# Patient Record
Sex: FEMALE | Race: WHITE | Employment: OTHER | URBAN - METROPOLITAN AREA
[De-identification: names, ages, dates, MRNs, and addresses within clinical notes are randomized per-mention and may not be internally consistent; named-entity substitution may affect disease eponyms.]

---

## 2020-05-19 ENCOUNTER — APPOINTMENT (OUTPATIENT)
Dept: GENERAL RADIOLOGY | Age: 85
End: 2020-05-19
Payer: MEDICARE

## 2020-05-19 ENCOUNTER — APPOINTMENT (OUTPATIENT)
Dept: CT IMAGING | Age: 85
End: 2020-05-19
Payer: MEDICARE

## 2020-05-19 ENCOUNTER — HOSPITAL ENCOUNTER (EMERGENCY)
Age: 85
Discharge: HOME | End: 2020-05-19
Attending: EMERGENCY MEDICINE
Payer: MEDICARE

## 2020-05-19 VITALS
HEIGHT: 57 IN | DIASTOLIC BLOOD PRESSURE: 68 MMHG | BODY MASS INDEX: 22.44 KG/M2 | OXYGEN SATURATION: 97 % | RESPIRATION RATE: 16 BRPM | SYSTOLIC BLOOD PRESSURE: 188 MMHG | HEART RATE: 63 BPM | TEMPERATURE: 97.6 F | WEIGHT: 104 LBS

## 2020-05-19 LAB
ALBUMIN SERPL-MCNC: 4.2 G/DL (ref 3.5–5.2)
ALP BLD-CCNC: 31 U/L (ref 35–104)
ALT SERPL-CCNC: 14 U/L (ref 0–32)
ANION GAP SERPL CALCULATED.3IONS-SCNC: 15 MMOL/L (ref 7–16)
AST SERPL-CCNC: 25 U/L (ref 0–31)
BACTERIA: NORMAL /HPF
BASOPHILS ABSOLUTE: 0.04 E9/L (ref 0–0.2)
BASOPHILS RELATIVE PERCENT: 0.6 % (ref 0–2)
BILIRUB SERPL-MCNC: 1.1 MG/DL (ref 0–1.2)
BILIRUBIN URINE: NEGATIVE
BLOOD, URINE: ABNORMAL
BUN BLDV-MCNC: 24 MG/DL (ref 8–23)
CALCIUM SERPL-MCNC: 10.2 MG/DL (ref 8.6–10.2)
CHLORIDE BLD-SCNC: 107 MMOL/L (ref 98–107)
CLARITY: CLEAR
CO2: 23 MMOL/L (ref 22–29)
COLOR: YELLOW
CREAT SERPL-MCNC: 1.2 MG/DL (ref 0.5–1)
EOSINOPHILS ABSOLUTE: 0.05 E9/L (ref 0.05–0.5)
EOSINOPHILS RELATIVE PERCENT: 0.8 % (ref 0–6)
GFR AFRICAN AMERICAN: 51
GFR NON-AFRICAN AMERICAN: 42 ML/MIN/1.73
GLUCOSE BLD-MCNC: 103 MG/DL (ref 74–99)
GLUCOSE URINE: NEGATIVE MG/DL
HCT VFR BLD CALC: 40.8 % (ref 34–48)
HEMOGLOBIN: 14.1 G/DL (ref 11.5–15.5)
IMMATURE GRANULOCYTES #: 0.02 E9/L
IMMATURE GRANULOCYTES %: 0.3 % (ref 0–5)
KETONES, URINE: NEGATIVE MG/DL
LEUKOCYTE ESTERASE, URINE: NEGATIVE
LYMPHOCYTES ABSOLUTE: 0.87 E9/L (ref 1.5–4)
LYMPHOCYTES RELATIVE PERCENT: 13.4 % (ref 20–42)
MCH RBC QN AUTO: 31.9 PG (ref 26–35)
MCHC RBC AUTO-ENTMCNC: 34.6 % (ref 32–34.5)
MCV RBC AUTO: 92.3 FL (ref 80–99.9)
MONOCYTES ABSOLUTE: 0.44 E9/L (ref 0.1–0.95)
MONOCYTES RELATIVE PERCENT: 6.8 % (ref 2–12)
NEUTROPHILS ABSOLUTE: 5.07 E9/L (ref 1.8–7.3)
NEUTROPHILS RELATIVE PERCENT: 78.1 % (ref 43–80)
NITRITE, URINE: NEGATIVE
PDW BLD-RTO: 13.2 FL (ref 11.5–15)
PH UA: 7 (ref 5–9)
PLATELET # BLD: 165 E9/L (ref 130–450)
PMV BLD AUTO: 11.2 FL (ref 7–12)
POTASSIUM REFLEX MAGNESIUM: 4.3 MMOL/L (ref 3.5–5)
PROTEIN UA: 30 MG/DL
RBC # BLD: 4.42 E12/L (ref 3.5–5.5)
RBC UA: NORMAL /HPF (ref 0–2)
SODIUM BLD-SCNC: 145 MMOL/L (ref 132–146)
SPECIFIC GRAVITY UA: 1.01 (ref 1–1.03)
TOTAL PROTEIN: 7.2 G/DL (ref 6.4–8.3)
TROPONIN: <0.01 NG/ML (ref 0–0.03)
UROBILINOGEN, URINE: 0.2 E.U./DL
WBC # BLD: 6.5 E9/L (ref 4.5–11.5)
WBC UA: NORMAL /HPF (ref 0–5)

## 2020-05-19 PROCEDURE — 70450 CT HEAD/BRAIN W/O DYE: CPT

## 2020-05-19 PROCEDURE — 93005 ELECTROCARDIOGRAM TRACING: CPT | Performed by: EMERGENCY MEDICINE

## 2020-05-19 PROCEDURE — 71046 X-RAY EXAM CHEST 2 VIEWS: CPT

## 2020-05-19 PROCEDURE — 80053 COMPREHEN METABOLIC PANEL: CPT

## 2020-05-19 PROCEDURE — 36415 COLL VENOUS BLD VENIPUNCTURE: CPT

## 2020-05-19 PROCEDURE — 85025 COMPLETE CBC W/AUTO DIFF WBC: CPT

## 2020-05-19 PROCEDURE — 84484 ASSAY OF TROPONIN QUANT: CPT

## 2020-05-19 PROCEDURE — 99284 EMERGENCY DEPT VISIT MOD MDM: CPT

## 2020-05-19 PROCEDURE — 81001 URINALYSIS AUTO W/SCOPE: CPT

## 2020-05-19 RX ORDER — AMLODIPINE BESYLATE 5 MG/1
5 TABLET ORAL DAILY
COMMUNITY

## 2020-05-19 RX ORDER — ISOSORBIDE MONONITRATE 30 MG/1
30 TABLET, EXTENDED RELEASE ORAL DAILY
COMMUNITY

## 2020-05-20 LAB
EKG ATRIAL RATE: 60 BPM
EKG P AXIS: 65 DEGREES
EKG P-R INTERVAL: 192 MS
EKG Q-T INTERVAL: 420 MS
EKG QRS DURATION: 100 MS
EKG QTC CALCULATION (BAZETT): 420 MS
EKG R AXIS: 36 DEGREES
EKG T AXIS: -24 DEGREES
EKG VENTRICULAR RATE: 60 BPM

## 2020-05-20 PROCEDURE — 93010 ELECTROCARDIOGRAM REPORT: CPT | Performed by: INTERNAL MEDICINE

## 2020-05-20 NOTE — ED PROVIDER NOTES
HPI:  5/19/20,   Time: 10:11 PM EDT        Maurice Demarco is a 80 y.o. female presenting to the ED for episode of lightheadedness. Patient states earlier this evening she started to feel funny but denies any specific complaints at that time, patient states she took her nitro even though she was not having any chest pain, shortly after she noted that she felt lightheaded and shaky which lasted for several minutes before resolving spontaneously. Patient denies any complaints at this time. She denies headache, visual changes, syncope, chest pain, shortness of breath, abdominal pain, NVD, or any other complaints at this time. ROS:   GEN: no fevers, no chills, no fatique  HENT: No trauma, no sore throat, no swelling throat or oral mucosa  EYES: No changes in vision, no pain  CARDIO: No chest pain, no palpitations  PULM: No trouble breathing, no wheezing  ABD: No pain, no nausea, no vomiting  MSK: No pain, no trauma, no deformities  SKIN: No rashes, no abrasions, no lacerations  NEURO: See HPI     --------------------------------------------- PAST HISTORY ---------------------------------------------  Past Medical History:  has a past medical history of Hyperlipidemia and Hypertension. Past Surgical History:  has a past surgical history that includes Coronary artery bypass graft and Hysterectomy. Social History:  reports that she has never smoked. She has never used smokeless tobacco. She reports previous alcohol use. She reports that she does not use drugs. Family History: family history is not on file. The patients home medications have been reviewed. Allergies: Patient has no known allergies.     -------------------------------------------------- RESULTS -------------------------------------------------  All laboratory and radiology results have been personally reviewed by myself   LABS:  Results for orders placed or performed during the hospital encounter of 05/19/20   CBC Auto Differential Result Value Ref Range    WBC 6.5 4.5 - 11.5 E9/L    RBC 4.42 3.50 - 5.50 E12/L    Hemoglobin 14.1 11.5 - 15.5 g/dL    Hematocrit 40.8 34.0 - 48.0 %    MCV 92.3 80.0 - 99.9 fL    MCH 31.9 26.0 - 35.0 pg    MCHC 34.6 (H) 32.0 - 34.5 %    RDW 13.2 11.5 - 15.0 fL    Platelets 735 611 - 537 E9/L    MPV 11.2 7.0 - 12.0 fL    Neutrophils % 78.1 43.0 - 80.0 %    Immature Granulocytes % 0.3 0.0 - 5.0 %    Lymphocytes % 13.4 (L) 20.0 - 42.0 %    Monocytes % 6.8 2.0 - 12.0 %    Eosinophils % 0.8 0.0 - 6.0 %    Basophils % 0.6 0.0 - 2.0 %    Neutrophils Absolute 5.07 1.80 - 7.30 E9/L    Immature Granulocytes # 0.02 E9/L    Lymphocytes Absolute 0.87 (L) 1.50 - 4.00 E9/L    Monocytes Absolute 0.44 0.10 - 0.95 E9/L    Eosinophils Absolute 0.05 0.05 - 0.50 E9/L    Basophils Absolute 0.04 0.00 - 0.20 E9/L   Comprehensive Metabolic Panel w/ Reflex to MG   Result Value Ref Range    Sodium 145 132 - 146 mmol/L    Potassium reflex Magnesium 4.3 3.5 - 5.0 mmol/L    Chloride 107 98 - 107 mmol/L    CO2 23 22 - 29 mmol/L    Anion Gap 15 7 - 16 mmol/L    Glucose 103 (H) 74 - 99 mg/dL    BUN 24 (H) 8 - 23 mg/dL    CREATININE 1.2 (H) 0.5 - 1.0 mg/dL    GFR Non-African American 42 >=60 mL/min/1.73    GFR African American 51     Calcium 10.2 8.6 - 10.2 mg/dL    Total Protein 7.2 6.4 - 8.3 g/dL    Alb 4.2 3.5 - 5.2 g/dL    Total Bilirubin 1.1 0.0 - 1.2 mg/dL    Alkaline Phosphatase 31 (L) 35 - 104 U/L    ALT 14 0 - 32 U/L    AST 25 0 - 31 U/L   Troponin   Result Value Ref Range    Troponin <0.01 0.00 - 0.03 ng/mL   Urinalysis, reflex to microscopic   Result Value Ref Range    Color, UA Yellow Straw/Yellow    Clarity, UA Clear Clear    Glucose, Ur Negative Negative mg/dL    Bilirubin Urine Negative Negative    Ketones, Urine Negative Negative mg/dL    Specific Gravity, UA 1.015 1.005 - 1.030    Blood, Urine TRACE-INTACT Negative    pH, UA 7.0 5.0 - 9.0    Protein, UA 30 (A) Negative mg/dL    Urobilinogen, Urine 0.2 <2.0 E.U./dL    Nitrite, Urine Negative Negative    Leukocyte Esterase, Urine Negative Negative   Microscopic Urinalysis   Result Value Ref Range    WBC, UA NONE 0 - 5 /HPF    RBC, UA 1-3 0 - 2 /HPF    Bacteria, UA NONE SEEN None Seen /HPF   EKG 12 Lead   Result Value Ref Range    Ventricular Rate 60 BPM    Atrial Rate 60 BPM    P-R Interval 192 ms    QRS Duration 100 ms    Q-T Interval 420 ms    QTc Calculation (Bazett) 420 ms    P Axis 65 degrees    R Axis 36 degrees    T Axis -24 degrees       RADIOLOGY:  Interpreted by Radiologist.  XR CHEST STANDARD (2 VW)   Final Result      No airspace opacities or pleural effusion. CT Head WO Contrast   Final Result      1. Asymmetric hyperdensity in the putamen of the left basal ganglia   may be due to asymmetric physiologic calcifications. Small hemorrhage   cannot be excluded. Follow-up CT of the head or MRI is recommended. 2.  No mass affect or edema is identified.          ------------------------- NURSING NOTES AND VITALS REVIEWED ---------------------------   The nursing notes within the ED encounter and vital signs as below have been reviewed. BP (!) 188/68   Pulse 63   Temp 97.6 °F (36.4 °C) (Infrared)   Resp 16   Ht 4' 9\" (1.448 m)   Wt 104 lb (47.2 kg)   SpO2 97%   BMI 22.51 kg/m²   Oxygen Saturation Interpretation: Normal    ---------------------------------------------------PHYSICAL EXAM--------------------------------------    GEN: No acute distress, well-appearing, well nourished   HENT: Normocephalic, atraumatic, oral mucosa moist  EYES: No scleral injection, no scleral icterus, PERRL   PULM: Lungs clear to ascultation bilaterally, no wheezes, no crackles  CARDIO: Regular rate, regular rhythm, normal L2/F5. + Systolic murmur  ABD: Soft, non-tender, no rigidity  MSK: No deformities, no edema, palpable pulses all extremities   SKIN: No rashes, no lacerations, no abrasions   NEURO: Awake, alert, appropriate. Cranial nerves II through XII grossly intact.   Muscle

## 2020-10-12 ENCOUNTER — APPOINTMENT (OUTPATIENT)
Dept: CT IMAGING | Age: 85
End: 2020-10-12
Payer: MEDICARE

## 2020-10-12 ENCOUNTER — HOSPITAL ENCOUNTER (EMERGENCY)
Age: 85
Discharge: HOME | End: 2020-10-12
Attending: EMERGENCY MEDICINE
Payer: MEDICARE

## 2020-10-12 VITALS
HEART RATE: 66 BPM | SYSTOLIC BLOOD PRESSURE: 144 MMHG | OXYGEN SATURATION: 94 % | DIASTOLIC BLOOD PRESSURE: 82 MMHG | WEIGHT: 101 LBS | RESPIRATION RATE: 16 BRPM | TEMPERATURE: 97.5 F | BODY MASS INDEX: 22.72 KG/M2 | HEIGHT: 56 IN

## 2020-10-12 PROCEDURE — 6370000000 HC RX 637 (ALT 250 FOR IP): Performed by: EMERGENCY MEDICINE

## 2020-10-12 PROCEDURE — 71250 CT THORAX DX C-: CPT

## 2020-10-12 PROCEDURE — 72125 CT NECK SPINE W/O DYE: CPT

## 2020-10-12 PROCEDURE — 73130 X-RAY EXAM OF HAND: CPT

## 2020-10-12 PROCEDURE — 70450 CT HEAD/BRAIN W/O DYE: CPT

## 2020-10-12 PROCEDURE — 72128 CT CHEST SPINE W/O DYE: CPT

## 2020-10-12 PROCEDURE — 73030 X-RAY EXAM OF SHOULDER: CPT

## 2020-10-12 PROCEDURE — 99283 EMERGENCY DEPT VISIT LOW MDM: CPT

## 2020-10-12 PROCEDURE — 99284 EMERGENCY DEPT VISIT MOD MDM: CPT

## 2020-10-12 RX ORDER — ACETAMINOPHEN 500 MG
1000 TABLET ORAL ONCE
Status: COMPLETED | OUTPATIENT
Start: 2020-10-12 | End: 2020-10-12

## 2020-10-12 RX ADMIN — ACETAMINOPHEN 1000 MG: 500 TABLET ORAL at 22:13

## 2020-10-12 ASSESSMENT — PAIN DESCRIPTION - FREQUENCY: FREQUENCY: CONTINUOUS

## 2020-10-12 ASSESSMENT — ENCOUNTER SYMPTOMS
COUGH: 0
SHORTNESS OF BREATH: 0
ABDOMINAL PAIN: 0
BACK PAIN: 1

## 2020-10-12 ASSESSMENT — PAIN DESCRIPTION - PAIN TYPE: TYPE: ACUTE PAIN

## 2020-10-12 ASSESSMENT — PAIN SCALES - GENERAL
PAINLEVEL_OUTOF10: 10
PAINLEVEL_OUTOF10: 10

## 2020-10-12 ASSESSMENT — PAIN DESCRIPTION - PROGRESSION: CLINICAL_PROGRESSION: GRADUALLY WORSENING

## 2020-10-12 ASSESSMENT — PAIN DESCRIPTION - ONSET: ONSET: ON-GOING

## 2020-10-12 ASSESSMENT — PAIN DESCRIPTION - ORIENTATION: ORIENTATION: LEFT

## 2020-10-13 NOTE — ED NOTES
Discharged  Walked 6 feet from bed to wheel chair-  Gait steady   To follow with [address], RN  10/12/20 8088

## 2020-10-13 NOTE — ED NOTES
Unable to ambulate due to leaving cane at home-  Pt is able to bear weight.   Dr Rl Ortiz aware  And is OK with this     Rodolfo Camacho RN  10/12/20 4597

## 2020-10-13 NOTE — ED PROVIDER NOTES
Musculoskeletal: Normal range of motion and neck supple. Vascular: No JVD. Cardiovascular:      Rate and Rhythm: Normal rate and regular rhythm. Heart sounds: No murmur. Pulmonary:      Effort: Pulmonary effort is normal.      Breath sounds: No wheezing, rhonchi or rales. Chest:      Chest wall: No tenderness. Abdominal:      General: There is no distension. Palpations: Abdomen is soft. Tenderness: There is no abdominal tenderness. There is no guarding or rebound. Hernia: No hernia is present. Musculoskeletal:      Right lower leg: No edema. Left lower leg: No edema. Comments: No midline c-spine, t-spine or l-spine tenderness to palpation or stepoffs. No hip tenderness to palpation bilaterally or instability. Full ROM of bilateral upper and lower extremities. Tendenress over left shoulder but full ROM of left shoulder, subjective tenderness to palaption, palpable pulses, compartments soft   Skin:     General: Skin is warm and dry. Capillary Refill: Capillary refill takes less than 2 seconds. Neurological:      General: No focal deficit present. Mental Status: She is alert and oriented to person, place, and time. Cranial Nerves: No cranial nerve deficit. Psychiatric:         Mood and Affect: Mood normal.         Behavior: Behavior normal.         Procedures    MDM  Number of Diagnoses or Management Options  Acute pain of left shoulder:   Fall from bed, initial encounter:   Traumatic injury of head with hematoma of scalp:   Diagnosis management comments: This is an pleasant 80-year-old female who is not any blood thinners other than aspirin who presented to the ED with her daughter after she woke up fell from her bed while trying to get up. Patient had a significant hematoma to the left side of her forehead well significant tenderness to her left shoulder although she did have full range of motion of the shoulder and good breath movement.   Patient was at her baseline had unremarkable neurological examination. Imaging did not demonstrate any acute intracranial bleed but did demonstrate a large hematoma to her left forehead. She had no acute fractures or pneumothorax on imaging the patient was able to stand and walk and had no signs of a hip fracture. I had a long discussion with the patient as well as the daughter who is at bedside and asked if they need to be admitted for evaluation. Patient daughter stated they were gone to be going home and she was going to watch her. I did give him strict return precautions specifically any kind of change in mentation and they were both agreeable plan.               --------------------------------------------- PAST HISTORY ---------------------------------------------  Past Medical History:  has a past medical history of Hyperlipidemia and Hypertension. Past Surgical History:  has a past surgical history that includes Coronary artery bypass graft and Hysterectomy. Social History:  reports that she has never smoked. She has never used smokeless tobacco. She reports previous alcohol use. She reports that she does not use drugs. Family History: family history is not on file. The patients home medications have been reviewed. Allergies: Patient has no known allergies. -------------------------------------------------- RESULTS -------------------------------------------------  Labs:  No results found for this visit on 10/12/20. Radiology:  CT HEAD WO CONTRAST   Final Result   CT head:      1. No skull fracture or acute intracranial abnormality. 2. Large left frontoparietal scalp hematoma. CT cervical spine:      1. No fracture or joint dislocation is seen. 2. Degenerative changes as described. CT thoracic spine:      1. No fracture or joint dislocation is seen. 2. Mild degenerative changes. CT CERVICAL SPINE WO CONTRAST   Final Result   CT head:      1.  No skull fracture or acute intracranial abnormality. 2. Large left frontoparietal scalp hematoma. CT cervical spine:      1. No fracture or joint dislocation is seen. 2. Degenerative changes as described. CT thoracic spine:      1. No fracture or joint dislocation is seen. 2. Mild degenerative changes. CT THORACIC SPINE WO CONTRAST   Final Result   CT head:      1. No skull fracture or acute intracranial abnormality. 2. Large left frontoparietal scalp hematoma. CT cervical spine:      1. No fracture or joint dislocation is seen. 2. Degenerative changes as described. CT thoracic spine:      1. No fracture or joint dislocation is seen. 2. Mild degenerative changes. CT CHEST WO CONTRAST   Final Result   1. No acute cardiopulmonary abnormality. Specifically, no evidence of   pneumonia. 2. 2 mm left lower lobe pulmonary nodule, likely benign. If indicated, CT   follow-up may be obtained in 12 months. 3. Cardiomegaly without evidence of acute decompensation. XR SHOULDER LEFT (MIN 2 VIEWS)   Final Result   No acute fracture or dislocation of the left shoulder. Mild degenerative changes of the glenohumeral and acromioclavicular joints. Faint calcific density bordering the greater tuberosity which may be   secondary to calcific tendinitis. XR HAND LEFT (MIN 3 VIEWS)   Final Result   1. No acute osseous findings about the left hand. In the setting of trauma, if there is persistent symptoms and physical exam   warrants a repeat radiograph in 10-14 days could be considered as occult   fractures may not be evident on initial imaging evaluation. 2.  Decreased osseous mineralization.   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             ------------------------- NURSING NOTES AND VITALS REVIEWED ---------------------------  Date / Time Roomed:  10/12/2020  8:47 PM  ED Bed Assignment:  ROXANE/ROXANE    The nursing notes within the ED encounter and vital signs as below have been reviewed. BP (!) 144/82   Pulse 66   Temp 97.5 °F (36.4 °C) (Tympanic)   Resp 16   Ht 4' 8\" (1.422 m)   Wt 101 lb (45.8 kg)   SpO2 94%   BMI 22.64 kg/m²   Oxygen Saturation Interpretation: Normal      ------------------------------------------ PROGRESS NOTES ------------------------------------------  1:05 AM EDT  I have spoken with the patient and discussed todays results, in addition to providing specific details for the plan of care and counseling regarding the diagnosis and prognosis. Their questions are answered at this time and they are agreeable with the plan. I discussed at length with them reasons for immediate return here for re evaluation. They will followup with their PCP.      --------------------------------- ADDITIONAL PROVIDER NOTES ---------------------------------  At this time the patient is without objective evidence of an acute process requiring hospitalization or inpatient management. They have remained hemodynamically stable throughout their entire ED visit and are stable for discharge with outpatient follow-up. The plan has been discussed in detail and they are aware of the specific conditions for emergent return, as well as the importance of follow-up. Discharge Medication List as of 10/12/2020 10:30 PM          Diagnosis:  1. Traumatic injury of head with hematoma of scalp    2. Fall from bed, initial encounter    3. Acute pain of left shoulder        Disposition:  Patient's disposition: Discharge to home  Patient's condition is stable.      Lenny Le DO  10/14/20 0110

## 2020-11-27 ENCOUNTER — APPOINTMENT (OUTPATIENT)
Dept: GENERAL RADIOLOGY | Age: 85
End: 2020-11-27
Payer: MEDICARE

## 2020-11-27 ENCOUNTER — HOSPITAL ENCOUNTER (EMERGENCY)
Age: 85
Discharge: HOME | End: 2020-11-27
Attending: EMERGENCY MEDICINE
Payer: MEDICARE

## 2020-11-27 VITALS
DIASTOLIC BLOOD PRESSURE: 68 MMHG | HEIGHT: 57 IN | SYSTOLIC BLOOD PRESSURE: 204 MMHG | WEIGHT: 95 LBS | TEMPERATURE: 96.9 F | RESPIRATION RATE: 14 BRPM | OXYGEN SATURATION: 98 % | HEART RATE: 60 BPM | BODY MASS INDEX: 20.49 KG/M2

## 2020-11-27 LAB
ALBUMIN SERPL-MCNC: 4.1 G/DL (ref 3.5–5.2)
ALP BLD-CCNC: 28 U/L (ref 35–104)
ALT SERPL-CCNC: 12 U/L (ref 0–32)
ANION GAP SERPL CALCULATED.3IONS-SCNC: 10 MMOL/L (ref 7–16)
AST SERPL-CCNC: 19 U/L (ref 0–31)
BACTERIA: ABNORMAL /HPF
BASOPHILS ABSOLUTE: 0.04 E9/L (ref 0–0.2)
BASOPHILS RELATIVE PERCENT: 0.8 % (ref 0–2)
BILIRUB SERPL-MCNC: 0.9 MG/DL (ref 0–1.2)
BILIRUBIN URINE: NEGATIVE
BLOOD, URINE: NEGATIVE
BUN BLDV-MCNC: 31 MG/DL (ref 8–23)
CALCIUM SERPL-MCNC: 10.6 MG/DL (ref 8.6–10.2)
CHLORIDE BLD-SCNC: 108 MMOL/L (ref 98–107)
CLARITY: CLEAR
CO2: 28 MMOL/L (ref 22–29)
COLOR: YELLOW
CREAT SERPL-MCNC: 1.3 MG/DL (ref 0.5–1)
EKG ATRIAL RATE: 64 BPM
EKG P AXIS: 79 DEGREES
EKG P-R INTERVAL: 184 MS
EKG Q-T INTERVAL: 410 MS
EKG QRS DURATION: 96 MS
EKG QTC CALCULATION (BAZETT): 422 MS
EKG R AXIS: 38 DEGREES
EKG T AXIS: -44 DEGREES
EKG VENTRICULAR RATE: 64 BPM
EOSINOPHILS ABSOLUTE: 0.08 E9/L (ref 0.05–0.5)
EOSINOPHILS RELATIVE PERCENT: 1.6 % (ref 0–6)
EPITHELIAL CELLS, UA: ABNORMAL /HPF
GFR AFRICAN AMERICAN: 46
GFR NON-AFRICAN AMERICAN: 38 ML/MIN/1.73
GLUCOSE BLD-MCNC: 103 MG/DL (ref 74–99)
GLUCOSE URINE: NEGATIVE MG/DL
HCT VFR BLD CALC: 35.7 % (ref 34–48)
HEMOGLOBIN: 12.1 G/DL (ref 11.5–15.5)
IMMATURE GRANULOCYTES #: 0.02 E9/L
IMMATURE GRANULOCYTES %: 0.4 % (ref 0–5)
KETONES, URINE: NEGATIVE MG/DL
LACTIC ACID: 1 MMOL/L (ref 0.5–2.2)
LEUKOCYTE ESTERASE, URINE: NEGATIVE
LYMPHOCYTES ABSOLUTE: 1.08 E9/L (ref 1.5–4)
LYMPHOCYTES RELATIVE PERCENT: 21 % (ref 20–42)
MCH RBC QN AUTO: 32.1 PG (ref 26–35)
MCHC RBC AUTO-ENTMCNC: 33.9 % (ref 32–34.5)
MCV RBC AUTO: 94.7 FL (ref 80–99.9)
MONOCYTES ABSOLUTE: 0.41 E9/L (ref 0.1–0.95)
MONOCYTES RELATIVE PERCENT: 8 % (ref 2–12)
NEUTROPHILS ABSOLUTE: 3.52 E9/L (ref 1.8–7.3)
NEUTROPHILS RELATIVE PERCENT: 68.2 % (ref 43–80)
NITRITE, URINE: NEGATIVE
PDW BLD-RTO: 13.8 FL (ref 11.5–15)
PH UA: 6.5 (ref 5–9)
PLATELET # BLD: 184 E9/L (ref 130–450)
PMV BLD AUTO: 11.1 FL (ref 7–12)
POTASSIUM SERPL-SCNC: 5.2 MMOL/L (ref 3.5–5)
PROTEIN UA: ABNORMAL MG/DL
RBC # BLD: 3.77 E12/L (ref 3.5–5.5)
RBC UA: ABNORMAL /HPF (ref 0–2)
RENAL EPITHELIAL, UA: ABNORMAL /HPF
SODIUM BLD-SCNC: 146 MMOL/L (ref 132–146)
SPECIFIC GRAVITY UA: 1.01 (ref 1–1.03)
TOTAL CK: 57 U/L (ref 20–180)
TOTAL PROTEIN: 6.8 G/DL (ref 6.4–8.3)
TROPONIN: <0.01 NG/ML (ref 0–0.03)
UROBILINOGEN, URINE: 0.2 E.U./DL
WBC # BLD: 5.2 E9/L (ref 4.5–11.5)
WBC UA: ABNORMAL /HPF (ref 0–5)

## 2020-11-27 PROCEDURE — 6370000000 HC RX 637 (ALT 250 FOR IP): Performed by: EMERGENCY MEDICINE

## 2020-11-27 PROCEDURE — 85025 COMPLETE CBC W/AUTO DIFF WBC: CPT

## 2020-11-27 PROCEDURE — 93005 ELECTROCARDIOGRAM TRACING: CPT | Performed by: EMERGENCY MEDICINE

## 2020-11-27 PROCEDURE — 81001 URINALYSIS AUTO W/SCOPE: CPT

## 2020-11-27 PROCEDURE — 96361 HYDRATE IV INFUSION ADD-ON: CPT

## 2020-11-27 PROCEDURE — 93010 ELECTROCARDIOGRAM REPORT: CPT | Performed by: INTERNAL MEDICINE

## 2020-11-27 PROCEDURE — 83605 ASSAY OF LACTIC ACID: CPT

## 2020-11-27 PROCEDURE — 36415 COLL VENOUS BLD VENIPUNCTURE: CPT

## 2020-11-27 PROCEDURE — 71045 X-RAY EXAM CHEST 1 VIEW: CPT

## 2020-11-27 PROCEDURE — 80053 COMPREHEN METABOLIC PANEL: CPT

## 2020-11-27 PROCEDURE — 99284 EMERGENCY DEPT VISIT MOD MDM: CPT

## 2020-11-27 PROCEDURE — 84484 ASSAY OF TROPONIN QUANT: CPT

## 2020-11-27 PROCEDURE — 96360 HYDRATION IV INFUSION INIT: CPT

## 2020-11-27 PROCEDURE — 82550 ASSAY OF CK (CPK): CPT

## 2020-11-27 PROCEDURE — 2580000003 HC RX 258: Performed by: EMERGENCY MEDICINE

## 2020-11-27 PROCEDURE — 70450 CT HEAD/BRAIN W/O DYE: CPT

## 2020-11-27 RX ORDER — 0.9 % SODIUM CHLORIDE 0.9 %
1000 INTRAVENOUS SOLUTION INTRAVENOUS ONCE
Status: COMPLETED | OUTPATIENT
Start: 2020-11-27 | End: 2020-11-27

## 2020-11-27 RX ORDER — AMLODIPINE BESYLATE 5 MG/1
5 TABLET ORAL DAILY
Status: DISCONTINUED | OUTPATIENT
Start: 2020-11-27 | End: 2020-11-27 | Stop reason: HOSPADM

## 2020-11-27 RX ORDER — METOPROLOL TARTRATE 50 MG/1
25 TABLET, FILM COATED ORAL ONCE
Status: COMPLETED | OUTPATIENT
Start: 2020-11-27 | End: 2020-11-27

## 2020-11-27 RX ADMIN — SODIUM CHLORIDE 1000 ML: 9 INJECTION, SOLUTION INTRAVENOUS at 10:06

## 2020-11-27 RX ADMIN — AMLODIPINE BESYLATE 5 MG: 5 TABLET ORAL at 11:36

## 2020-11-27 RX ADMIN — METOPROLOL TARTRATE 25 MG: 50 TABLET, FILM COATED ORAL at 11:35

## 2020-11-27 NOTE — ED PROVIDER NOTES
HPI:  11/27/20, Time: 9:43 AM KEAGAN Montanez is a 80 y.o. female presenting to the ED for memory issues, beginning about 1-2 weeks ago since head injury on 10/12/20. Was seen and evaluated after head injury and was ok, but since then daughter states patient has been having issues with memory (such as recalling that she is her daughter etc). The complaint has been intermittent, moderate in severity, and worsened by nothing. Patient has absolutely no complaints. Patient denies fever/chills, sore throat, cough, congestion, chest pain, shortness of breath, edema, headache, visual disturbances, focal paresthesias, focal weakness, abdominal pain, nausea, vomiting, diarrhea, constipation, dysuria, hematuria, trauma, neck or back pain, rash or other complaints. ROS:   A complete review of systems was performed and all pertinent positives and negatives are stated within HPI, all other systems reviewed and are negative.      --------------------------------------------- PAST HISTORY ---------------------------------------------  Past Medical History:  has a past medical history of Hyperlipidemia and Hypertension. Past Surgical History:  has a past surgical history that includes Coronary artery bypass graft and Hysterectomy. Social History:  reports that she has never smoked. She has never used smokeless tobacco. She reports previous alcohol use. She reports that she does not use drugs. Family History: family history is not on file. The patients home medications have been reviewed. Allergies: Patient has no known allergies. ----------------------------------------PHYSICAL EXAM--------------------------------------  Constitutional:  Well developed, well nourished, no acute distress, non-toxic appearance   Eyes:  PERRL, conjunctiva normal, EOMI  HENT:  Atraumatic except small hematoma on left forehead, external ears normal, nose normal, oropharynx moist, no pharyngeal exudates. Neck- normal range of motion, no nuchal rigidity   Respiratory:  No respiratory distress, normal breath sounds, no rales, no wheezing   Cardiovascular:  Normal rate, normal rhythm, 3/6 murmur, no gallops, no rubs. Radial and DP pulses 2+ bilaterally. GI:  Soft, nondistended, normal bowel sounds, nontender, no rebound, no guarding   :  No costovertebral angle tenderness   Musculoskeletal:  No edema, no tenderness, no deformities. Back- no tenderness  Integument:  Well hydrated, no visiblerash. Adequate perfusion. Lymphatic:  No cervical lymphadenopathy noted   Neurologic:  Alert & oriented x 3, CN 2-12 normal, no focal deficits noted. DTRs 2+ bilateral patellar. Normal gait. Psychiatric:  Speech and behavior appropriate       -------------------------------------------------- RESULTS -------------------------------------------------  I have personally reviewed all laboratory and imaging results for this patient. Results are listed below.      LABS:  Results for orders placed or performed during the hospital encounter of 11/27/20   Lactic Acid, Plasma   Result Value Ref Range    Lactic Acid 1.0 0.5 - 2.2 mmol/L   CBC auto differential   Result Value Ref Range    WBC 5.2 4.5 - 11.5 E9/L    RBC 3.77 3.50 - 5.50 E12/L    Hemoglobin 12.1 11.5 - 15.5 g/dL    Hematocrit 35.7 34.0 - 48.0 %    MCV 94.7 80.0 - 99.9 fL    MCH 32.1 26.0 - 35.0 pg    MCHC 33.9 32.0 - 34.5 %    RDW 13.8 11.5 - 15.0 fL    Platelets 416 161 - 662 E9/L    MPV 11.1 7.0 - 12.0 fL    Neutrophils % 68.2 43.0 - 80.0 %    Immature Granulocytes % 0.4 0.0 - 5.0 %    Lymphocytes % 21.0 20.0 - 42.0 %    Monocytes % 8.0 2.0 - 12.0 %    Eosinophils % 1.6 0.0 - 6.0 %    Basophils % 0.8 0.0 - 2.0 %    Neutrophils Absolute 3.52 1.80 - 7.30 E9/L    Immature Granulocytes # 0.02 E9/L    Lymphocytes Absolute 1.08 (L) 1.50 - 4.00 E9/L    Monocytes Absolute 0.41 0.10 - 0.95 E9/L    Eosinophils Absolute 0.08 0.05 - 0.50 E9/L    Basophils Absolute 0.04 0.00 - 0.20 E9/L   Comprehensive Metabolic Panel   Result Value Ref Range    Sodium 146 132 - 146 mmol/L    Potassium 5.2 (H) 3.5 - 5.0 mmol/L    Chloride 108 (H) 98 - 107 mmol/L    CO2 28 22 - 29 mmol/L    Anion Gap 10 7 - 16 mmol/L    Glucose 103 (H) 74 - 99 mg/dL    BUN 31 (H) 8 - 23 mg/dL    CREATININE 1.3 (H) 0.5 - 1.0 mg/dL    GFR Non-African American 38 >=60 mL/min/1.73    GFR African American 46     Calcium 10.6 (H) 8.6 - 10.2 mg/dL    Total Protein 6.8 6.4 - 8.3 g/dL    Alb 4.1 3.5 - 5.2 g/dL    Total Bilirubin 0.9 0.0 - 1.2 mg/dL    Alkaline Phosphatase 28 (L) 35 - 104 U/L    ALT 12 0 - 32 U/L    AST 19 0 - 31 U/L   Troponin   Result Value Ref Range    Troponin <0.01 0.00 - 0.03 ng/mL   Urinalysis with Microscopic   Result Value Ref Range    Color, UA Yellow Straw/Yellow    Clarity, UA Clear Clear    Glucose, Ur Negative Negative mg/dL    Bilirubin Urine Negative Negative    Ketones, Urine Negative Negative mg/dL    Specific Gravity, UA 1.010 1.005 - 1.030    Blood, Urine Negative Negative    pH, UA 6.5 5.0 - 9.0    Protein, UA TRACE Negative mg/dL    Urobilinogen, Urine 0.2 <2.0 E.U./dL    Nitrite, Urine Negative Negative    Leukocyte Esterase, Urine Negative Negative    WBC, UA 2-5 0 - 5 /HPF    RBC, UA 0-1 0 - 2 /HPF    Epithelial Cells, UA FEW /HPF    Renal Epithelial, UA FEW /HPF    Bacteria, UA FEW (A) None Seen /HPF   CK   Result Value Ref Range    Total CK 57 20 - 180 U/L   EKG 12 Lead   Result Value Ref Range    Ventricular Rate 64 BPM    Atrial Rate 64 BPM    P-R Interval 184 ms    QRS Duration 96 ms    Q-T Interval 410 ms    QTc Calculation (Bazett) 422 ms    P Axis 79 degrees    R Axis 38 degrees    T Axis -44 degrees       RADIOLOGY:  Interpreted by Radiologist.  CT HEAD WO CONTRAST   Final Result   No active process. Stable findings as outlined above. XR CHEST PORTABLE   Final Result   Postsurgical changes. No new abnormal findings.             EKG Interpretation  Time: 0952  Rhythm: normal sinus Rate: 64  Axis: normal  Conduction: LVH  ST Segments: nonspecific changes  T Waves: non specific changes  Clinical Impression: non-specific EKG, LVH  Comparison to prior EKG: stable as compared to patient's most recent EKG      ------------------------- NURSING NOTES AND VITALS REVIEWED ---------------------------  The nursing notes within the ED encounter and vital signs as below have been reviewed by myself. BP (!) 204/68   Pulse 60   Temp 96.9 °F (36.1 °C) (Temporal)   Resp 14   Ht 4' 9\" (1.448 m)   Wt 95 lb (43.1 kg)   SpO2 98%   BMI 20.56 kg/m²   Oxygen Saturation Interpretation: Normal      The patients available past medical records and past encounters were reviewed. ------------------------------ ED COURSE/MEDICAL DECISION MAKING----------------------  Medications   amLODIPine (NORVASC) tablet 5 mg (5 mg Oral Given 11/27/20 1136)   0.9 % sodium chloride bolus (0 mLs Intravenous Stopped 11/27/20 1244)   metoprolol tartrate (LOPRESSOR) tablet 25 mg (25 mg Oral Given 11/27/20 1135)           Procedures:   none      Medical Decision Making:    Dehydration likely causea elevated K and BRANDEN. IV hydration given in ER   Elevated BP but asymptomatic and didn't take any of her 3 home BP meds this AM. Given meds here in ER but unwilling to wait for BP to come down much longer. Daughter willing to take her home   Patient was explicitly instructed on specific signs and symptoms on which to return to the emergency room for. Patient was instructed to return to the ER for any new or worsening symptoms. Additional discharge instructions were given verbally. All questions were answered. Patient is comfortable and agreeable with discharge plan. Patient in no acute distress and non-toxic in appearance.        This patient's ED course included: re-evaluation prior to disposition, IV saline and a personal history and physicial eaxmination    This patient has remained hemodynamically stable and been closely monitored during their ED course. Re-Evaluations:  Time: 1230   Re-evaluation. Patients symptoms are improving  Repeat physical examination is not changed      Consultations:   none    Critical Care: none    I, Wili Vicente, DO, am the Primary Provider of Record    Counseling: The emergency provider has spoken with the patient and daughter and discussed todays results, in addition to providing specific details for the plan of care and counseling regarding the diagnosis and prognosis. Questions are answered at this time and they are agreeable with the plan.    --------------------------- IMPRESSION AND DISPOSITION ---------------------------------    IMPRESSION  1. Concussion without loss of consciousness, initial encounter    2. Essential hypertension    3.  Dehydration        DISPOSITION  Disposition: Discharge to home  Patient condition is stable             Dagoberto Olson, DO  11/27/20 Bharath [id number], DO  11/27/20 1411

## 2020-11-27 NOTE — ED NOTES
Up to restroom again with steady gait. Back to cart without difficulty.      Madeline Álvarez RN  11/27/20 1200

## 2020-11-27 NOTE — ED NOTES
Dr. Mahsa Dukes notified of . [facility] for discharge.       Prasad Singh RN  11/27/20 9570

## 2021-03-26 ENCOUNTER — INITIAL CONSULT (OUTPATIENT)
Dept: GERIATRIC MEDICINE | Age: 86
End: 2021-03-26
Payer: MEDICARE

## 2021-03-26 VITALS
RESPIRATION RATE: 14 BRPM | SYSTOLIC BLOOD PRESSURE: 190 MMHG | DIASTOLIC BLOOD PRESSURE: 66 MMHG | WEIGHT: 96.3 LBS | BODY MASS INDEX: 20.21 KG/M2 | HEART RATE: 64 BPM | HEIGHT: 58 IN | TEMPERATURE: 96.8 F

## 2021-03-26 DIAGNOSIS — F02.80 LATE ONSET ALZHEIMER'S DISEASE WITHOUT BEHAVIORAL DISTURBANCE (HCC): Primary | ICD-10-CM

## 2021-03-26 DIAGNOSIS — G30.1 LATE ONSET ALZHEIMER'S DISEASE WITHOUT BEHAVIORAL DISTURBANCE (HCC): Primary | ICD-10-CM

## 2021-03-26 PROCEDURE — 99212 OFFICE O/P EST SF 10 MIN: CPT | Performed by: INTERNAL MEDICINE

## 2021-03-26 RX ORDER — DONEPEZIL HYDROCHLORIDE 5 MG/1
5 TABLET, FILM COATED ORAL NIGHTLY
COMMUNITY
End: 2021-09-27

## 2021-03-26 SDOH — ECONOMIC STABILITY: INCOME INSECURITY: HOW HARD IS IT FOR YOU TO PAY FOR THE VERY BASICS LIKE FOOD, HOUSING, MEDICAL CARE, AND HEATING?: NOT HARD AT ALL

## 2021-03-26 SDOH — ECONOMIC STABILITY: FOOD INSECURITY: WITHIN THE PAST 12 MONTHS, YOU WORRIED THAT YOUR FOOD WOULD RUN OUT BEFORE YOU GOT MONEY TO BUY MORE.: NEVER TRUE

## 2021-03-26 ASSESSMENT — PATIENT HEALTH QUESTIONNAIRE - PHQ9
SUM OF ALL RESPONSES TO PHQ QUESTIONS 1-9: 1
SUM OF ALL RESPONSES TO PHQ9 QUESTIONS 1 & 2: 1
2. FEELING DOWN, DEPRESSED OR HOPELESS: 1
1. LITTLE INTEREST OR PLEASURE IN DOING THINGS: 0

## 2021-03-26 NOTE — PROGRESS NOTES
CC Decline in mental status    Here with son  Answers phone and still calls out   And has a daughter , her caregiver who is sick today  Grew up on [facility] and graduated from [address]   But she could not remember year   Hx of A Fib on Eliquis   On metoprolol  Can still read  Teeth OK   Feet OK and daughter does nails   Family doing all IADL's  No driving x 2 years   She does all ADL's   [place] , [employer]  Will not wander out of home  There for 50 years  Cannot remember house number    x 20 years ,  worked at [employer] and had a  recent fall with head trauma  Head injury resulted in abrupt decline in [email or web address]  No GUNS, [place] 28? Thinks its OK   MMSE 22 , Clock 1/4  Dementia workup per Therese negative   May be on  memory pills   BP evaluation at end of exam still high, and she was worked up during interview.  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

## 2021-03-26 NOTE — PROGRESS NOTES
Chief Complaint   Patient presents with    Consultation     Referral from PCP, Dr Jenifer Licona re: dementia. Here with son, Gwen Kirby.  Blood Pressure Check     180/74. Repeat 190/66.  Hearing Problem     Some difficulty hearing    Immunizations     Per son, pt had both doses of the COVID vaccine.  Fall     Per son, pt fell Oct or Nov 2020. Since then has difficulty remembering how her daughter is related to her. Fall screening + for risk factors.

## 2021-03-26 NOTE — PATIENT INSTRUCTIONS
Patient Education        Preventing Falls: Care Instructions  Your Care Instructions     Getting around your home safely can be a challenge if you have injuries or health problems that make it easy for you to fall. Loose rugs and furniture in walkways are among the dangers for many older people who have problems walking or who have poor eyesight. People who have conditions such as arthritis, osteoporosis, or dementia also have to be careful not to fall. You can make your home safer with a few simple measures. Follow-up care is a key part of your treatment and safety. Be sure to make and go to all appointments, and call your doctor if you are having problems. It's also a good idea to know your test results and keep a list of the medicines you take. How can you care for yourself at home? Taking care of yourself  · You may get dizzy if you do not drink enough water. To prevent dehydration, drink plenty of fluids. Choose water and other caffeine-free clear liquids. If you have kidney, heart, or liver disease and have to limit fluids, talk with your doctor before you increase the amount of fluids you drink. · Exercise regularly to improve your strength, muscle tone, and balance. Walk if you can. Swimming may be a good choice if you cannot walk easily. · Have your vision and hearing checked each year or any time you notice a change. If you have trouble seeing and hearing, you might not be able to avoid objects and could lose your balance. · Know the side effects of the medicines you take. Ask your doctor or pharmacist whether the medicines you take can affect your balance. Sleeping pills or sedatives can affect your balance. · Limit the amount of alcohol you drink. Alcohol can impair your balance and other senses. · Ask your doctor whether calluses or corns on your feet need to be removed. If you wear loose-fitting shoes because of calluses or corns, you can lose your balance and fall.   · Talk to your doctor if you have numbness in your feet. Preventing falls at home  · Remove raised doorway thresholds, throw rugs, and clutter. Repair loose carpet or raised areas in the floor. · Move furniture and electrical cords to keep them out of walking paths. · Use nonskid floor wax, and wipe up spills right away, especially on ceramic tile floors. · If you use a walker or cane, put rubber tips on it. If you use crutches, clean the bottoms of them regularly with an abrasive pad, such as steel wool. · Keep your house well lit, especially Annell Jefe, and outside walkways. Use night-lights in areas such as hallways and bathrooms. Add extra light switches or use remote switches (such as switches that go on or off when you clap your hands) to make it easier to turn lights on if you have to get up during the night. · Install sturdy handrails on stairways. · Move items in your cabinets so that the things you use a lot are on the lower shelves (about waist level). · Keep a cordless phone and a flashlight with new batteries by your bed. If possible, put a phone in each of the main rooms of your house, or carry a cell phone in case you fall and cannot reach a phone. Or, you can wear a device around your neck or wrist. You push a button that sends a signal for help. · Wear low-heeled shoes that fit well and give your feet good support. Use footwear with nonskid soles. Check the heels and soles of your shoes for wear. Repair or replace worn heels or soles. · Do not wear socks without shoes on wood floors. · Walk on the grass when the sidewalks are slippery. If you live in an area that gets snow and ice in the winter, sprinkle salt on slippery steps and sidewalks. Preventing falls in the bath  · Install grab bars and nonskid mats inside and outside your shower or tub and near the toilet and sinks. · Use shower chairs and bath benches. · Use a hand-held shower head that will allow you to sit while showering.   · Get into a tub or shower by putting the weaker leg in first. Get out of a tub or shower with your strong side first.  · Repair loose toilet seats and consider installing a raised toilet seat to make getting on and off the toilet easier. · Keep your bathroom door unlocked while you are in the shower. Where can you learn more? Go to https://[employer] Corppepiceweb.[employer]. org and sign in to your [employer] account. Enter [phone] in the [facility] box to learn more about \"Preventing Falls: Care Instructions. \"     If you do not have an account, please click on the \"Sign Up Now\" link. Current as of: December 7, 2020               Content Version: 12.8  © 0653-7539 Healthwise, Incorporated. Care instructions adapted under license by [facility] ([facility]). If you have questions about a medical condition or this instruction, always ask your healthcare professional. [place] [address] any warranty or liability for your use of this information.

## 2021-03-29 ENCOUNTER — TELEPHONE (OUTPATIENT)
Dept: GERIATRIC MEDICINE | Age: 86
End: 2021-03-29

## 2021-03-29 NOTE — TELEPHONE ENCOUNTER
Pt called states that pt takes 5 mg aricept at night. States that you wanted to know.  Daughter said that you might increase the aricept

## 2021-03-30 RX ORDER — DONEPEZIL HYDROCHLORIDE 10 MG/1
10 TABLET, FILM COATED ORAL NIGHTLY
Qty: 30 TABLET | Refills: 3 | Status: SHIPPED
Start: 2021-03-30 | End: 2021-10-08

## 2021-07-11 ENCOUNTER — HOSPITAL ENCOUNTER (EMERGENCY)
Age: 86
Discharge: HOME | End: 2021-07-11
Payer: MEDICARE

## 2021-07-11 VITALS
HEART RATE: 66 BPM | DIASTOLIC BLOOD PRESSURE: 82 MMHG | WEIGHT: 96 LBS | HEIGHT: 58 IN | OXYGEN SATURATION: 98 % | TEMPERATURE: 97.3 F | RESPIRATION RATE: 16 BRPM | SYSTOLIC BLOOD PRESSURE: 187 MMHG | BODY MASS INDEX: 20.15 KG/M2

## 2021-07-11 DIAGNOSIS — S61.012A LACERATION OF LEFT THUMB WITHOUT FOREIGN BODY WITHOUT DAMAGE TO NAIL, INITIAL ENCOUNTER: Primary | ICD-10-CM

## 2021-07-11 PROCEDURE — 6360000002 HC RX W HCPCS: Performed by: NURSE PRACTITIONER

## 2021-07-11 PROCEDURE — 90471 IMMUNIZATION ADMIN: CPT | Performed by: NURSE PRACTITIONER

## 2021-07-11 PROCEDURE — 99282 EMERGENCY DEPT VISIT SF MDM: CPT

## 2021-07-11 PROCEDURE — 90715 TDAP VACCINE 7 YRS/> IM: CPT | Performed by: NURSE PRACTITIONER

## 2021-07-11 PROCEDURE — 12001 RPR S/N/AX/GEN/TRNK 2.5CM/<: CPT

## 2021-07-11 RX ADMIN — TETANUS TOXOID, REDUCED DIPHTHERIA TOXOID AND ACELLULAR PERTUSSIS VACCINE, ADSORBED 0.5 ML: 5; 2.5; 8; 8; 2.5 SUSPENSION INTRAMUSCULAR at 13:13

## 2021-07-11 NOTE — ED PROVIDER NOTES
Normal      ---------------------------------------------------PHYSICAL EXAM--------------------------------------      Constitutional/General: Alert and oriented x3, well appearing, non toxic in NAD  Head: NC/AT  Eyes: PERRL, EOMI  Mouth: Oropharynx clear, handling secretions, no trismus  Neck: Supple, full ROM, no meningeal signs  Pulmonary: Lungs clear to auscultation bilaterally, no wheezes, rales, or rhonchi. Not in respiratory distress  Cardiovascular:  Regular rate and rhythm, no murmurs, gallops, or rubs. 2+ distal pulses  Abdomen: Soft, non tender, non distended,   Extremities: Moves all extremities x 4. Warm and well perfused  Skin: warm and dry without rash. 0.5 cm linear laceration to the distal tip of the distal phalanx of the left thumb bleeding is controlled there is no foreign body visualized there is no movement of tendon or muscle. There is no surrounding erythema. Neurologic: GCS 15,  Psych: Normal Affect      ------------------------------ ED COURSE/MEDICAL DECISION MAKING----------------------  Medications   Tetanus-Diphth-Acell Pertussis (BOOSTRIX) injection 0.5 mL (0.5 mLs Intramuscular Given 7/11/21 1313)         Medical Decision Making: At this time the patient is without objective evidence of an acute process requiring hospitalization or inpatient management. They have remained hemodynamically stable throughout their entire ED visit and are stable for discharge with outpatient follow-up. The plan has been discussed in detail and they are aware of the specific conditions for emergent return, as well as the importance of follow-up. Wound was cleansed with sterile water and Dermabond was applied as well as a nonstick dressing. Patient educated on close outpatient follow-up at this time there is no bleeding to the wound. Patient is agreeable to plan of care all questions were answered. Patient nontoxic in appearance and in no distress. Counseling:    The emergency provider has spoken with the patient and discussed todays results, in addition to providing specific details for the plan of care and counseling regarding the diagnosis and prognosis. Questions are answered at this time and they are agreeable with the plan.      --------------------------------- IMPRESSION AND DISPOSITION ---------------------------------    IMPRESSION  1.  Laceration of left thumb without foreign body without damage to nail, initial encounter        DISPOSITION  Disposition: Discharge to home  Patient condition is good                 Horacio Tapia, [place]  07/11/21 7224

## 2021-07-11 NOTE — ED NOTES
Discharge instructions given. Patient states verbal understanding. Ambulatory to exit.        Gustavo Whaley, RN  07/11/21 1733

## 2021-09-27 ENCOUNTER — OFFICE VISIT (OUTPATIENT)
Dept: GERIATRIC MEDICINE | Age: 86
End: 2021-09-27
Payer: MEDICARE

## 2021-09-27 VITALS
TEMPERATURE: 97.2 F | BODY MASS INDEX: 19.85 KG/M2 | HEART RATE: 68 BPM | SYSTOLIC BLOOD PRESSURE: 176 MMHG | DIASTOLIC BLOOD PRESSURE: 66 MMHG | HEIGHT: 57 IN | RESPIRATION RATE: 20 BRPM | WEIGHT: 92 LBS

## 2021-09-27 DIAGNOSIS — G30.9 ALZHEIMER DISEASE (HCC): Primary | ICD-10-CM

## 2021-09-27 DIAGNOSIS — F02.80 ALZHEIMER DISEASE (HCC): Primary | ICD-10-CM

## 2021-09-27 PROCEDURE — 99212 OFFICE O/P EST SF 10 MIN: CPT | Performed by: INTERNAL MEDICINE

## 2021-09-27 NOTE — PROGRESS NOTES
Chief Complaint   Patient presents with    6 Month Follow-Up     2nd visit. Last seen in March re: Dementia. Here with daughter, Nilda Pineda (pt initially identified her as her mother)    Medication Refill     Needs a RF of Donepezil 10 mg.    Blood Pressure Check     174/64. Repeat 176/66.  Nasal Congestion     Daily runny nose, x 6 months, per daughter.  Weight Loss     Weight down 4# since March visit. Daughter states pt eats, but not enough. Has not tried Ensure -- suggested. Dr Valencia Hernandez notified of above.

## 2021-09-27 NOTE — PROGRESS NOTES
CC Evaluate Alzheimer's disease    Here with daughter Ok Rubi   And may be mistaken for sister or mother    and she cannot remember [address] [place] and address?   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

## 2021-10-08 RX ORDER — DONEPEZIL HYDROCHLORIDE 10 MG/1
10 TABLET, FILM COATED ORAL NIGHTLY
Qty: 90 TABLET | Refills: 0 | Status: SHIPPED
Start: 2021-10-08 | End: 2022-01-28 | Stop reason: SDUPTHER

## 2022-01-28 RX ORDER — DONEPEZIL HYDROCHLORIDE 10 MG/1
10 TABLET, FILM COATED ORAL NIGHTLY
Qty: 90 TABLET | Refills: 0 | Status: SHIPPED | OUTPATIENT
Start: 2022-01-28

## 2022-04-25 ENCOUNTER — OFFICE VISIT (OUTPATIENT)
Dept: GERIATRIC MEDICINE | Age: 87
End: 2022-04-25
Payer: MEDICARE

## 2022-04-25 VITALS
WEIGHT: 99.8 LBS | TEMPERATURE: 97.8 F | SYSTOLIC BLOOD PRESSURE: 138 MMHG | DIASTOLIC BLOOD PRESSURE: 62 MMHG | BODY MASS INDEX: 21.53 KG/M2 | RESPIRATION RATE: 24 BRPM | HEIGHT: 57 IN | HEART RATE: 64 BPM

## 2022-04-25 DIAGNOSIS — G30.9 ALZHEIMER DISEASE (HCC): Primary | ICD-10-CM

## 2022-04-25 DIAGNOSIS — F02.80 ALZHEIMER DISEASE (HCC): Primary | ICD-10-CM

## 2022-04-25 PROCEDURE — 99212 OFFICE O/P EST SF 10 MIN: CPT | Performed by: INTERNAL MEDICINE

## 2022-04-25 SDOH — ECONOMIC STABILITY: FOOD INSECURITY: WITHIN THE PAST 12 MONTHS, YOU WORRIED THAT YOUR FOOD WOULD RUN OUT BEFORE YOU GOT MONEY TO BUY MORE.: NEVER TRUE

## 2022-04-25 SDOH — ECONOMIC STABILITY: FOOD INSECURITY: WITHIN THE PAST 12 MONTHS, THE FOOD YOU BOUGHT JUST DIDN'T LAST AND YOU DIDN'T HAVE MONEY TO GET MORE.: NEVER TRUE

## 2022-04-25 ASSESSMENT — PATIENT HEALTH QUESTIONNAIRE - PHQ9
SUM OF ALL RESPONSES TO PHQ QUESTIONS 1-9: 0
1. LITTLE INTEREST OR PLEASURE IN DOING THINGS: 0
SUM OF ALL RESPONSES TO PHQ9 QUESTIONS 1 & 2: 0
SUM OF ALL RESPONSES TO PHQ QUESTIONS 1-9: 0
2. FEELING DOWN, DEPRESSED OR HOPELESS: 0

## 2022-04-25 ASSESSMENT — SOCIAL DETERMINANTS OF HEALTH (SDOH): HOW HARD IS IT FOR YOU TO PAY FOR THE VERY BASICS LIKE FOOD, HOUSING, MEDICAL CARE, AND HEATING?: NOT HARD AT ALL

## 2022-04-25 NOTE — PROGRESS NOTES
Here after Hip Fx, ORIF after returning home from last visit and dog interaction   Now recognizes daughter as Michel Mooney knows BD'  Since discharge at daughter's home  daughter's home   In Hospital x 4 days   Then [facility] then home  ADL intact  Here with Paige Gómez   Sleeps well  IADL's per daughter   And staying with daughter   May use cane and misplaces it  Impression: S/P General Anesthesia and initially head                      Injury and early Alzheimer's disease                      Knows daughter better                      Scores stable   Plan; Aricept 10 mg a day

## 2022-04-25 NOTE — PATIENT INSTRUCTIONS
Patient Education        Preventing Falls: Care Instructions  Your Care Instructions     Getting around your home safely can be a challenge if you have injuries or health problems that make it easy for you to fall. Loose rugs and furniture in walkways are among the dangers for many older people who have problems walking or who have poor eyesight. People who have conditions such as arthritis,osteoporosis, or dementia also have to be careful not to fall. You can make your home safer with a few simple measures. Follow-up care is a key part of your treatment and safety. Be sure to make and go to all appointments, and call your doctor if you are having problems. It's also a good idea to know your test results and keep alist of the medicines you take. How can you care for yourself at home? Taking care of yourself   Exercise regularly to improve your strength, muscle tone, and balance. Walk if you can. Swimming may be a good choice if you cannot walk easily.  Have your vision and hearing checked each year or any time you notice a change. If you have trouble seeing and hearing, you might not be able to avoid objects and could lose your balance.  Know the side effects of the medicines you take. Ask your doctor or pharmacist whether the medicines you take can affect your balance. Sleeping pills or sedatives can affect your balance.  Limit the amount of alcohol you drink. Alcohol can impair your balance and other senses.  Ask your doctor whether calluses or corns on your feet need to be removed. If you wear loose-fitting shoes because of calluses or corns, you can lose your balance and fall.  Talk to your doctor if you have numbness in your feet.  You may get dizzy if you do not drink enough water. To prevent dehydration, drink plenty of fluids. Choose water and other clear liquids.  If you have kidney, heart, or liver disease and have to limit fluids, talk with your doctor before you increase the amount of fluids you drink. Preventing falls at home   Remove raised doorway thresholds, throw rugs, and clutter. Repair loose carpet or raised areas in the floor. [facility] furniture and electrical cords to keep them out of walking paths.  Use nonskid floor wax, and wipe up spills right away, especially on ceramic tile floors.  If you use a walker or cane, put rubber tips on it. If you use crutches, clean the bottoms of them regularly with an abrasive pad, such as steel wool.  Keep your house well lit, especially Sandra Villa, and outside walkways. Use night-lights in areas such as hallways and bathrooms. Add extra light switches or use remote switches (such as switches that go on or off when you clap your hands) to make it easier to turn lights on if you have to get up during the night.  Install sturdy handrails on stairways.  Move items in your cabinets so that the things you use a lot are on the lower shelves (about waist level).  Keep a cordless phone and a flashlight with new batteries by your bed. If possible, put a phone in each of the main rooms of your house, or carry a cell phone in case you fall and cannot reach a phone. Or, you can wear a device around your neck or wrist. You push a button that sends a signal for help.  Wear low-heeled shoes that fit well and give your feet good support. Use footwear with nonskid soles. Check the heels and soles of your shoes for wear. Repair or replace worn heels or soles.  Do not wear socks without shoes on wood floors.  Walk on the grass when the sidewalks are slippery. If you live in an area that gets snow and ice in the winter, sprinkle salt on slippery steps and sidewalks. Or ask a family member or friend to do this for you. Preventing falls in the bath   Install grab bars and nonskid mats inside and outside your shower or tub and near the toilet and sinks.  Use shower chairs and bath benches.    Use a hand-held shower head that will allow you to sit while showering.  Get into a tub or shower by putting the weaker leg in first. Get out of a tub or shower with your strong side first.   Repair loose toilet seats and consider installing a raised toilet seat to make getting on and off the toilet easier.  Keep your bathroom door unlocked while you are in the shower. Where can you learn more? Go to https://[employer] Systemseweb.[employer]. org and sign in to your [employer] account. Enter [phone] in the [facility] box to learn more about \"Preventing Falls: Care Instructions. \"     If you do not have an account, please click on the \"Sign Up Now\" link. Current as of: September 8, 2021               Content Version: 13.2  © 5678-4601 Healthwise, Incorporated. Care instructions adapted under license by [facility] ([facility]). If you have questions about a medical condition or this instruction, always ask your healthcare professional. [place] [address] any warranty or liability for your use of this information.

## 2022-04-25 NOTE — PROGRESS NOTES
Chief Complaint   Patient presents with    6 Month Follow-Up     September follow up re:  Early SDAT. Here with daughter, Ellis Henley. Per daughter, pt has been living with her since November. Pt wants to move back home.  Blood Pressure Check     142/66. Repeat 138/62.  Incontinence     Daughter states pt has been on a \"little blue pill\" every other day, to help with incontinence. Will follow up with urology this Wednesday.  Fall     Per daughter, pt fell  in September because of the dog. Broken right hip -- surgically repaired.  Weight Gain     Weight is up almost 8 # since September visit. Above issues discussed with Dr Maira Pisano prior to his seeing the pt.

## 2022-05-30 ENCOUNTER — APPOINTMENT (OUTPATIENT)
Dept: GENERAL RADIOLOGY | Age: 87
End: 2022-05-30
Payer: MEDICARE

## 2022-05-30 ENCOUNTER — HOSPITAL ENCOUNTER (INPATIENT)
Age: 87
LOS: 2 days | Discharge: HOME | DRG: 291 | End: 2022-06-01
Attending: INTERNAL MEDICINE | Admitting: INTERNAL MEDICINE
Payer: MEDICARE

## 2022-05-30 ENCOUNTER — HOSPITAL ENCOUNTER (EMERGENCY)
Age: 87
Discharge: ANOTHER HOSPITAL | End: 2022-05-30
Attending: EMERGENCY MEDICINE
Payer: MEDICARE

## 2022-05-30 VITALS
WEIGHT: 94 LBS | OXYGEN SATURATION: 96 % | BODY MASS INDEX: 19.73 KG/M2 | HEART RATE: 86 BPM | TEMPERATURE: 97.2 F | DIASTOLIC BLOOD PRESSURE: 60 MMHG | SYSTOLIC BLOOD PRESSURE: 178 MMHG | RESPIRATION RATE: 18 BRPM | HEIGHT: 58 IN

## 2022-05-30 DIAGNOSIS — R01.1 MURMUR: ICD-10-CM

## 2022-05-30 DIAGNOSIS — I10 ESSENTIAL HYPERTENSION: ICD-10-CM

## 2022-05-30 DIAGNOSIS — I48.91 ATRIAL FIBRILLATION, UNSPECIFIED TYPE (HCC): ICD-10-CM

## 2022-05-30 DIAGNOSIS — I50.9 ACUTE ON CHRONIC CONGESTIVE HEART FAILURE, UNSPECIFIED HEART FAILURE TYPE (HCC): Primary | ICD-10-CM

## 2022-05-30 LAB
ALBUMIN SERPL-MCNC: 4 G/DL (ref 3.5–5.2)
ALP BLD-CCNC: 42 U/L (ref 35–104)
ALT SERPL-CCNC: 19 U/L (ref 0–32)
ANION GAP SERPL CALCULATED.3IONS-SCNC: 13 MMOL/L (ref 7–16)
AST SERPL-CCNC: 32 U/L (ref 0–31)
BASOPHILS ABSOLUTE: 0.05 E9/L (ref 0–0.2)
BASOPHILS RELATIVE PERCENT: 0.7 % (ref 0–2)
BILIRUB SERPL-MCNC: 1.4 MG/DL (ref 0–1.2)
BUN BLDV-MCNC: 27 MG/DL (ref 6–23)
CALCIUM SERPL-MCNC: 10.1 MG/DL (ref 8.6–10.2)
CHLORIDE BLD-SCNC: 106 MMOL/L (ref 98–107)
CO2: 23 MMOL/L (ref 22–29)
CREAT SERPL-MCNC: 1.2 MG/DL (ref 0.5–1)
EOSINOPHILS ABSOLUTE: 0.05 E9/L (ref 0.05–0.5)
EOSINOPHILS RELATIVE PERCENT: 0.7 % (ref 0–6)
GFR AFRICAN AMERICAN: 51
GFR NON-AFRICAN AMERICAN: 42 ML/MIN/1.73
GLUCOSE BLD-MCNC: 102 MG/DL (ref 74–99)
HCT VFR BLD CALC: 35.3 % (ref 34–48)
HEMOGLOBIN: 11.9 G/DL (ref 11.5–15.5)
IMMATURE GRANULOCYTES #: 0.01 E9/L
IMMATURE GRANULOCYTES %: 0.1 % (ref 0–5)
INFLUENZA A: NOT DETECTED
INFLUENZA B: NOT DETECTED
LACTIC ACID: 1.1 MMOL/L (ref 0.5–2.2)
LYMPHOCYTES ABSOLUTE: 0.97 E9/L (ref 1.5–4)
LYMPHOCYTES RELATIVE PERCENT: 14.4 % (ref 20–42)
MCH RBC QN AUTO: 29.6 PG (ref 26–35)
MCHC RBC AUTO-ENTMCNC: 33.7 % (ref 32–34.5)
MCV RBC AUTO: 87.8 FL (ref 80–99.9)
MONOCYTES ABSOLUTE: 0.55 E9/L (ref 0.1–0.95)
MONOCYTES RELATIVE PERCENT: 8.2 % (ref 2–12)
NEUTROPHILS ABSOLUTE: 5.09 E9/L (ref 1.8–7.3)
NEUTROPHILS RELATIVE PERCENT: 75.9 % (ref 43–80)
PDW BLD-RTO: 14.3 FL (ref 11.5–15)
PLATELET # BLD: 215 E9/L (ref 130–450)
PMV BLD AUTO: 11.1 FL (ref 7–12)
POTASSIUM SERPL-SCNC: 4.3 MMOL/L (ref 3.5–5)
PRO-BNP: ABNORMAL PG/ML (ref 0–450)
RBC # BLD: 4.02 E12/L (ref 3.5–5.5)
SARS-COV-2 RNA, RT PCR: NOT DETECTED
SODIUM BLD-SCNC: 142 MMOL/L (ref 132–146)
TOTAL PROTEIN: 6.8 G/DL (ref 6.4–8.3)
TROPONIN, HIGH SENSITIVITY: 42 NG/L (ref 0–9)
WBC # BLD: 6.7 E9/L (ref 4.5–11.5)

## 2022-05-30 PROCEDURE — 36415 COLL VENOUS BLD VENIPUNCTURE: CPT

## 2022-05-30 PROCEDURE — 99285 EMERGENCY DEPT VISIT HI MDM: CPT

## 2022-05-30 PROCEDURE — 1200000000 HC SEMI PRIVATE

## 2022-05-30 PROCEDURE — 85025 COMPLETE CBC W/AUTO DIFF WBC: CPT

## 2022-05-30 PROCEDURE — 71045 X-RAY EXAM CHEST 1 VIEW: CPT

## 2022-05-30 PROCEDURE — G0378 HOSPITAL OBSERVATION PER HR: HCPCS

## 2022-05-30 PROCEDURE — 84484 ASSAY OF TROPONIN QUANT: CPT

## 2022-05-30 PROCEDURE — 96374 THER/PROPH/DIAG INJ IV PUSH: CPT

## 2022-05-30 PROCEDURE — 80053 COMPREHEN METABOLIC PANEL: CPT

## 2022-05-30 PROCEDURE — 87636 SARSCOV2 & INF A&B AMP PRB: CPT

## 2022-05-30 PROCEDURE — 83605 ASSAY OF LACTIC ACID: CPT

## 2022-05-30 PROCEDURE — 96376 TX/PRO/DX INJ SAME DRUG ADON: CPT

## 2022-05-30 PROCEDURE — 6360000002 HC RX W HCPCS: Performed by: EMERGENCY MEDICINE

## 2022-05-30 PROCEDURE — G0379 DIRECT REFER HOSPITAL OBSERV: HCPCS

## 2022-05-30 PROCEDURE — 83880 ASSAY OF NATRIURETIC PEPTIDE: CPT

## 2022-05-30 PROCEDURE — 93005 ELECTROCARDIOGRAM TRACING: CPT | Performed by: EMERGENCY MEDICINE

## 2022-05-30 RX ORDER — ASPIRIN 81 MG/1
81 TABLET ORAL DAILY
Status: DISCONTINUED | OUTPATIENT
Start: 2022-05-31 | End: 2022-06-01 | Stop reason: HOSPADM

## 2022-05-30 RX ORDER — NITROGLYCERIN 0.4 MG/1
0.4 TABLET SUBLINGUAL EVERY 5 MIN PRN
Status: DISCONTINUED | OUTPATIENT
Start: 2022-05-30 | End: 2022-05-31

## 2022-05-30 RX ORDER — ATORVASTATIN CALCIUM 40 MG/1
80 TABLET, FILM COATED ORAL DAILY
Status: DISCONTINUED | OUTPATIENT
Start: 2022-05-31 | End: 2022-06-01 | Stop reason: HOSPADM

## 2022-05-30 RX ORDER — TROSPIUM CHLORIDE 20 MG/1
20 TABLET, FILM COATED ORAL NIGHTLY
Status: DISCONTINUED | OUTPATIENT
Start: 2022-05-30 | End: 2022-06-01 | Stop reason: HOSPADM

## 2022-05-30 RX ORDER — FUROSEMIDE 10 MG/ML
20 INJECTION INTRAMUSCULAR; INTRAVENOUS ONCE
Status: COMPLETED | OUTPATIENT
Start: 2022-05-30 | End: 2022-05-30

## 2022-05-30 RX ORDER — AMLODIPINE BESYLATE 5 MG/1
5 TABLET ORAL DAILY
Status: DISCONTINUED | OUTPATIENT
Start: 2022-05-31 | End: 2022-06-01 | Stop reason: HOSPADM

## 2022-05-30 RX ORDER — ISOSORBIDE MONONITRATE 30 MG/1
30 TABLET, EXTENDED RELEASE ORAL DAILY
Status: DISCONTINUED | OUTPATIENT
Start: 2022-05-31 | End: 2022-06-01 | Stop reason: HOSPADM

## 2022-05-30 RX ORDER — DONEPEZIL HYDROCHLORIDE 10 MG/1
10 TABLET, FILM COATED ORAL NIGHTLY
Status: DISCONTINUED | OUTPATIENT
Start: 2022-05-30 | End: 2022-06-01 | Stop reason: HOSPADM

## 2022-05-30 RX ORDER — FUROSEMIDE 10 MG/ML
40 INJECTION INTRAMUSCULAR; INTRAVENOUS ONCE
Status: DISCONTINUED | OUTPATIENT
Start: 2022-05-30 | End: 2022-05-30

## 2022-05-30 RX ORDER — MECLIZINE HCL 12.5 MG/1
25 TABLET ORAL 3 TIMES DAILY PRN
Status: DISCONTINUED | OUTPATIENT
Start: 2022-05-30 | End: 2022-06-01 | Stop reason: HOSPADM

## 2022-05-30 RX ADMIN — FUROSEMIDE 20 MG: 10 INJECTION, SOLUTION INTRAMUSCULAR; INTRAVENOUS at 13:54

## 2022-05-30 RX ADMIN — FUROSEMIDE 20 MG: 10 INJECTION, SOLUTION INTRAMUSCULAR; INTRAVENOUS at 14:36

## 2022-05-30 ASSESSMENT — PAIN - FUNCTIONAL ASSESSMENT
PAIN_FUNCTIONAL_ASSESSMENT: NONE - DENIES PAIN
PAIN_FUNCTIONAL_ASSESSMENT: ACTIVITIES ARE NOT PREVENTED
PAIN_FUNCTIONAL_ASSESSMENT: NONE - DENIES PAIN

## 2022-05-30 ASSESSMENT — PAIN DESCRIPTION - LOCATION: LOCATION: CHEST

## 2022-05-30 ASSESSMENT — PAIN DESCRIPTION - ONSET: ONSET: GRADUAL

## 2022-05-30 ASSESSMENT — PAIN DESCRIPTION - FREQUENCY: FREQUENCY: INTERMITTENT

## 2022-05-30 ASSESSMENT — PAIN DESCRIPTION - DESCRIPTORS: DESCRIPTORS: ACHING

## 2022-05-30 ASSESSMENT — PAIN DESCRIPTION - ORIENTATION: ORIENTATION: MID

## 2022-05-30 ASSESSMENT — PAIN SCALES - GENERAL: PAINLEVEL_OUTOF10: 3

## 2022-05-30 ASSESSMENT — PAIN DESCRIPTION - PAIN TYPE: TYPE: ACUTE PAIN

## 2022-05-30 NOTE — ED PROVIDER NOTES
HPI:  5/30/22, Time: 11:40 AM EDT         Hemal Nunez is a 80 y.o. female presenting to the ED for shortness of breath with minimal exertion, beginning one week ago. The complaint has been persistent, moderate in severity, and worsened by light exertion. States she can't even put her clothes on without get short of breath. She reports Tuesday she was shopping and became short of breath and sat down and apparently had a brief LOC because the next thing she knew the EMS was there. Per daughter patient has history of afib per cardiologist and takes Eliquis BID for it (though not in Epic to verify). Patient denies fever/chills, sore throat, cough, congestion, chest pain, edema, headache, visual disturbances, focal paresthesias, focal weakness, abdominal pain, nausea, vomiting, diarrhea, constipation, dysuria, hematuria, trauma, neck or back pain, rash or other complaints. No changes in her medication regimen. Patient unable to speak more than 3-4 words without becoming dyspneic. ROS:   A complete review of systems was performed and all pertinent positives and negatives are stated within HPI, all other systems reviewed and are negative.      --------------------------------------------- PAST HISTORY ---------------------------------------------  Past Medical History:  has a past medical history of Hip fracture, right ([address] Hyperlipidemia, and Hypertension. Past Surgical History:  has a past surgical history that includes Coronary artery bypass graft; Hysterectomy; and joint replacement. Social History:  reports that she has never smoked. She has never used smokeless tobacco. She reports previous alcohol use. She reports that she does not use drugs. Family History: family history is not on file. The patients home medications have been reviewed. Allergies: Patient has no known allergies.         ----------------------------------------PHYSICAL EXAM--------------------------------------  Constitutional:  Well developed, well nourished, no acute distress, non-toxic appearance   Eyes:  PERRL, conjunctiva normal, EOMI  HENT:  Atraumatic, external ears normal, nose normal, oropharynx moist. Neck- normal range of motion, no nuchal rigidity   Respiratory:  No respiratory distress, diffusely diminished at bases, no wheezing, no rhonchi  Cardiovascular:  Normal rate, irregularly irregular rhythm, 4 out of 6 systolic murmur, no gallops, no rubs. Radial and DP pulses 2+ bilaterally. Compartments are soft. She is warm and well perfused. Cap refill less than 3 seconds. GI:  Soft, nondistended, normal bowel sounds, nontender, no organomegaly, no mass, no rebound, no guarding   :  No costovertebral angle tenderness   Musculoskeletal: Left ankle pitting lower extremity edema, she states this is baseline for her since her CABG years ago. Edema, no tenderness, no deformities. Back- no tenderness  Integument:  Well hydrated, no rash. Adequate perfusion. Lymphatic:  No cervical lymphadenopathy noted   Neurologic:  Alert & oriented x 3, CN 2-12 normal,  no focal deficits noted. Psychiatric:  Speech and behavior appropriate       -------------------------------------------------- RESULTS -------------------------------------------------  I have personally reviewed all laboratory and imaging results for this patient. Results are listed below.      LABS:  Results for orders placed or performed during the hospital encounter of 05/30/22   COVID-19 & Influenza Combo    Specimen: Nasopharyngeal Swab   Result Value Ref Range    SARS-CoV-2 RNA, RT PCR NOT DETECTED NOT DETECTED    INFLUENZA A NOT DETECTED NOT DETECTED    INFLUENZA B NOT DETECTED NOT DETECTED   CBC with Auto Differential   Result Value Ref Range    WBC 6.7 4.5 - 11.5 E9/L    RBC 4.02 3.50 - 5.50 E12/L    Hemoglobin 11.9 11.5 - 15.5 g/dL    Hematocrit 35.3 34.0 - 48.0 %    MCV 87.8 80.0 - 99.9 fL    MCH 29.6 26.0 - 35.0 pg    MCHC 33.7 32.0 - 34.5 %    RDW 14.3 11.5 - 15.0 fL    Platelets 319 546 - 747 E9/L    MPV 11.1 7.0 - 12.0 fL    Neutrophils % 75.9 43.0 - 80.0 %    Immature Granulocytes % 0.1 0.0 - 5.0 %    Lymphocytes % 14.4 (L) 20.0 - 42.0 %    Monocytes % 8.2 2.0 - 12.0 %    Eosinophils % 0.7 0.0 - 6.0 %    Basophils % 0.7 0.0 - 2.0 %    Neutrophils Absolute 5.09 1.80 - 7.30 E9/L    Immature Granulocytes # 0.01 E9/L    Lymphocytes Absolute 0.97 (L) 1.50 - 4.00 E9/L    Monocytes Absolute 0.55 0.10 - 0.95 E9/L    Eosinophils Absolute 0.05 0.05 - 0.50 E9/L    Basophils Absolute 0.05 0.00 - 0.20 E9/L   Comprehensive Metabolic Panel   Result Value Ref Range    Sodium 142 132 - 146 mmol/L    Potassium 4.3 3.5 - 5.0 mmol/L    Chloride 106 98 - 107 mmol/L    CO2 23 22 - 29 mmol/L    Anion Gap 13 7 - 16 mmol/L    Glucose 102 (H) 74 - 99 mg/dL    BUN 27 (H) 6 - 23 mg/dL    CREATININE 1.2 (H) 0.5 - 1.0 mg/dL    GFR Non-African American 42 >=60 mL/min/1.73    GFR African American 51     Calcium 10.1 8.6 - 10.2 mg/dL    Total Protein 6.8 6.4 - 8.3 g/dL    Albumin 4.0 3.5 - 5.2 g/dL    Total Bilirubin 1.4 (H) 0.0 - 1.2 mg/dL    Alkaline Phosphatase 42 35 - 104 U/L    ALT 19 0 - 32 U/L    AST 32 (H) 0 - 31 U/L   Brain Natriuretic Peptide   Result Value Ref Range    Pro-BNP 14,791 (H) 0 - 450 pg/mL   Troponin   Result Value Ref Range    Troponin, High Sensitivity 42 (H) 0 - 9 ng/L   Lactic Acid   Result Value Ref Range    Lactic Acid 1.1 0.5 - 2.2 mmol/L   EKG 12 Lead   Result Value Ref Range    Ventricular Rate 82 BPM    Atrial Rate 75 BPM    QRS Duration 106 ms    Q-T Interval 378 ms    QTc Calculation (Bazett) 441 ms    R Axis 76 degrees    T Axis -73 degrees       RADIOLOGY:  Interpreted by Radiologist.  XR CHEST PORTABLE   Final Result   CHF bilateral pleural effusion. Superimposed infiltrates at the lung bases   cannot be excluded.                EKG Interpretation  Time: 11:41 AM ET  Rhythm: atrial fibrillation - controlled  Rate: 82  Axis: normal  Conduction: normal  ST Segments: nonspecific changes  T Waves: non specific changes  Clinical Impression: non-specific EKG, atrial fibrillation (chronic) and left ventricular hypertrophy  Comparison to prior EKG: changes as compared to patient's most recent EKG      ------------------------- NURSING NOTES AND VITALS REVIEWED ---------------------------  The nursing notes within the ED encounter and vital signs as below have been reviewed by myself. BP (!) 180/68   Pulse 87   Temp 97.2 °F (36.2 °C)   Resp 20   Ht 4' 10\" (1.473 m)   Wt 94 lb (42.6 kg)   SpO2 94%   BMI 19.65 kg/m²   Oxygen Saturation Interpretation: Normal      The patients available past medical records and past encounters were reviewed. ------------------------------ ED COURSE/MEDICAL DECISION MAKING----------------------  Medications   furosemide (LASIX) injection 20 mg (has no administration in time range)   furosemide (LASIX) injection 20 mg (20 mg IntraVENous Given 5/30/22 1354)           Procedures:   none      Medical Decision Making:    Loud murmur (likely aortic stenosis) and will picture consistent with lab findings of congestive heart failure. 20 IV Lasix given initially. Has made urine already. Placed on 2 L nasal cannula for 94% O2 sat and for comfort. Blood pressure is a little elevated. Given another 20 mg IV Lasix per admitting physician discretion and patient appears very comfortable at this time. She remains hemodynamically stable while in emergency department. Patient would like admitted to [address] Cardiologist is Dr. Mary Sheppard. This patient's ED course included: re-evaluation prior to disposition, IV medications, cardiac monitoring, continuous pulse oximetry and a personal history and physicial eaxmination    This patient has remained hemodynamically stable, improved and been closely monitored during their ED course.     Re-Evaluations:  Time: 2:30 PM EDT Re-evaluation. Patients symptoms are improving  Repeat physical examination is improved      Consultations:   2:29 PM EDT  Spoke with Dr Serina Ramos, discussed case, accepts patient to Dr Jody Walsh service. Would like monitored bed and another Lasix 20mg prior to transfer    Critical Care: none    I, Arnold Stevens, DO, am the Primary Provider of Record    Counseling: The emergency provider has spoken with the patient and daughter and discussed todays results, in addition to providing specific details for the plan of care and counseling regarding the diagnosis and prognosis. Questions are answered at this time and they are agreeable with the plan.    --------------------------- IMPRESSION AND DISPOSITION ---------------------------------    IMPRESSION  1. Acute on chronic congestive heart failure, unspecified heart failure type ([address]    2. Murmur    3. Atrial fibrillation, unspecified type ([address]    4.  Essential hypertension        DISPOSITION  Disposition: Transfer to [facility]  Patient condition is stable             Jackie Cardenas DO  05/30/22 6060

## 2022-05-30 NOTE — ED NOTES
Transfer report given to Olga Natarajan at [facility]. Shift hand off report given to [facility].      Eyal Kelly RN  05/30/22 1914

## 2022-05-31 PROBLEM — E78.5 HYPERLIPEMIA: Status: ACTIVE | Noted: 2022-05-31

## 2022-05-31 PROBLEM — R55 SYNCOPE: Status: ACTIVE | Noted: 2022-05-31

## 2022-05-31 PROBLEM — R07.9 CHEST PAIN: Status: ACTIVE | Noted: 2022-05-31

## 2022-05-31 PROBLEM — I48.91 ATRIAL FIBRILLATION (HCC): Status: ACTIVE | Noted: 2022-05-31

## 2022-05-31 PROBLEM — I25.10 ASCVD (ARTERIOSCLEROTIC CARDIOVASCULAR DISEASE): Status: ACTIVE | Noted: 2022-05-31

## 2022-05-31 PROBLEM — I10 BENIGN HYPERTENSION: Status: ACTIVE | Noted: 2022-05-31

## 2022-05-31 PROBLEM — I50.9 ACUTE CONGESTIVE HEART FAILURE (HCC): Status: ACTIVE | Noted: 2022-05-31

## 2022-05-31 LAB
ANION GAP SERPL CALCULATED.3IONS-SCNC: 13 MMOL/L (ref 7–16)
BASOPHILS ABSOLUTE: 0.03 E9/L (ref 0–0.2)
BASOPHILS RELATIVE PERCENT: 0.7 % (ref 0–2)
BUN BLDV-MCNC: 28 MG/DL (ref 6–23)
CALCIUM SERPL-MCNC: 9.6 MG/DL (ref 8.6–10.2)
CHLORIDE BLD-SCNC: 105 MMOL/L (ref 98–107)
CO2: 26 MMOL/L (ref 22–29)
CREAT SERPL-MCNC: 1.4 MG/DL (ref 0.5–1)
EKG ATRIAL RATE: 75 BPM
EKG Q-T INTERVAL: 378 MS
EKG QRS DURATION: 106 MS
EKG QTC CALCULATION (BAZETT): 441 MS
EKG R AXIS: 76 DEGREES
EKG T AXIS: -73 DEGREES
EKG VENTRICULAR RATE: 82 BPM
EOSINOPHILS ABSOLUTE: 0.08 E9/L (ref 0.05–0.5)
EOSINOPHILS RELATIVE PERCENT: 1.8 % (ref 0–6)
GFR AFRICAN AMERICAN: 42
GFR NON-AFRICAN AMERICAN: 35 ML/MIN/1.73
GLUCOSE BLD-MCNC: 84 MG/DL (ref 74–99)
HCT VFR BLD CALC: 35.6 % (ref 34–48)
HEMOGLOBIN: 11.4 G/DL (ref 11.5–15.5)
IMMATURE GRANULOCYTES #: 0.01 E9/L
IMMATURE GRANULOCYTES %: 0.2 % (ref 0–5)
LV EF: 55 %
LVEF MODALITY: NORMAL
LYMPHOCYTES ABSOLUTE: 0.91 E9/L (ref 1.5–4)
LYMPHOCYTES RELATIVE PERCENT: 20.3 % (ref 20–42)
MCH RBC QN AUTO: 28.6 PG (ref 26–35)
MCHC RBC AUTO-ENTMCNC: 32 % (ref 32–34.5)
MCV RBC AUTO: 89.4 FL (ref 80–99.9)
MONOCYTES ABSOLUTE: 0.45 E9/L (ref 0.1–0.95)
MONOCYTES RELATIVE PERCENT: 10 % (ref 2–12)
NEUTROPHILS ABSOLUTE: 3.01 E9/L (ref 1.8–7.3)
NEUTROPHILS RELATIVE PERCENT: 67 % (ref 43–80)
PDW BLD-RTO: 14.3 FL (ref 11.5–15)
PLATELET # BLD: 201 E9/L (ref 130–450)
PMV BLD AUTO: 11.5 FL (ref 7–12)
POTASSIUM SERPL-SCNC: 4.2 MMOL/L (ref 3.5–5)
RBC # BLD: 3.98 E12/L (ref 3.5–5.5)
REASON FOR REJECTION: NORMAL
REJECTED TEST: NORMAL
SODIUM BLD-SCNC: 144 MMOL/L (ref 132–146)
TROPONIN, HIGH SENSITIVITY: 51 NG/L (ref 0–9)
TROPONIN, HIGH SENSITIVITY: 55 NG/L (ref 0–9)
TROPONIN, HIGH SENSITIVITY: 56 NG/L (ref 0–9)
TROPONIN, HIGH SENSITIVITY: 59 NG/L (ref 0–9)
WBC # BLD: 4.5 E9/L (ref 4.5–11.5)

## 2022-05-31 PROCEDURE — G0378 HOSPITAL OBSERVATION PER HR: HCPCS

## 2022-05-31 PROCEDURE — 80048 BASIC METABOLIC PNL TOTAL CA: CPT

## 2022-05-31 PROCEDURE — 36415 COLL VENOUS BLD VENIPUNCTURE: CPT

## 2022-05-31 PROCEDURE — 93306 TTE W/DOPPLER COMPLETE: CPT

## 2022-05-31 PROCEDURE — 2500000003 HC RX 250 WO HCPCS: Performed by: INTERNAL MEDICINE

## 2022-05-31 PROCEDURE — 96376 TX/PRO/DX INJ SAME DRUG ADON: CPT

## 2022-05-31 PROCEDURE — 96374 THER/PROPH/DIAG INJ IV PUSH: CPT

## 2022-05-31 PROCEDURE — 1200000000 HC SEMI PRIVATE

## 2022-05-31 PROCEDURE — 6370000000 HC RX 637 (ALT 250 FOR IP): Performed by: INTERNAL MEDICINE

## 2022-05-31 PROCEDURE — 84484 ASSAY OF TROPONIN QUANT: CPT

## 2022-05-31 PROCEDURE — 85025 COMPLETE CBC W/AUTO DIFF WBC: CPT

## 2022-05-31 RX ORDER — BUMETANIDE 0.25 MG/ML
1 INJECTION, SOLUTION INTRAMUSCULAR; INTRAVENOUS 2 TIMES DAILY
Status: DISCONTINUED | OUTPATIENT
Start: 2022-05-31 | End: 2022-06-01

## 2022-05-31 RX ADMIN — APIXABAN 2.5 MG: 2.5 TABLET, FILM COATED ORAL at 08:42

## 2022-05-31 RX ADMIN — TROSPIUM CHLORIDE 20 MG: 20 TABLET, FILM COATED ORAL at 21:39

## 2022-05-31 RX ADMIN — AMLODIPINE BESYLATE 5 MG: 5 TABLET ORAL at 08:42

## 2022-05-31 RX ADMIN — APIXABAN 2.5 MG: 2.5 TABLET, FILM COATED ORAL at 00:25

## 2022-05-31 RX ADMIN — APIXABAN 2.5 MG: 2.5 TABLET, FILM COATED ORAL at 21:39

## 2022-05-31 RX ADMIN — DONEPEZIL HYDROCHLORIDE 10 MG: 10 TABLET, FILM COATED ORAL at 21:39

## 2022-05-31 RX ADMIN — TROSPIUM CHLORIDE 20 MG: 20 TABLET, FILM COATED ORAL at 00:25

## 2022-05-31 RX ADMIN — DONEPEZIL HYDROCHLORIDE 10 MG: 10 TABLET, FILM COATED ORAL at 00:25

## 2022-05-31 RX ADMIN — BUMETANIDE 1 MG: 0.25 INJECTION, SOLUTION INTRAMUSCULAR; INTRAVENOUS at 21:38

## 2022-05-31 RX ADMIN — ASPIRIN 81 MG: 81 TABLET, COATED ORAL at 08:42

## 2022-05-31 RX ADMIN — BUMETANIDE 1 MG: 0.25 INJECTION, SOLUTION INTRAMUSCULAR; INTRAVENOUS at 11:00

## 2022-05-31 RX ADMIN — ISOSORBIDE MONONITRATE 30 MG: 30 TABLET, EXTENDED RELEASE ORAL at 08:42

## 2022-05-31 ASSESSMENT — PAIN SCALES - GENERAL
PAINLEVEL_OUTOF10: 0

## 2022-05-31 NOTE — PROGRESS NOTES
At this time patient does not want a brown catheter. RN explained importance of accurate measurement of urine. Pt and family expressed understanding. Hat placed in toilet for patient use.

## 2022-05-31 NOTE — CONSULTS
INPATIENT CONSULT-[facility] CARDIOLOGY    Name: Yannick Juárez    Age: 80 y.o. Date of Admission: 5/30/2022  8:45 PM    Date of Service: 5/31/2022    Reason for Consultation: Worsening dyspnea and brief syncope    Referring Physician: Seth Ybarra    History of Present Illness: The patient is a 80y.o. year old female admitted with markedly worsening dyspnea on exertion activity she could tolerate in the past.  Brief loss of consciousness in the recent past prompting admission to [facility] through the ER, where chest x-ray showed pulmonary edema and bilateral pleural effusions and with echocardiogram showed normal ejection fraction, critical aortic stenosis with peak/mean gradients 110/60 mmHg respectively, mild aortic regurgitation and moderately severe mitral regurgitation. Apparently had profound dyspnea on presentation but was given 40 mg of IV Lasix and is now 500 cc negative. Certainly appears comfortable and in no distress. Past Medical History:   Hypertension, hyperlipidemia, CAD post CABG March 2006. Paroxysmal atrial fibrillation, moderately severe aortic stenosis and regurgitation on echocardiogram with preserved ejection fraction and moderate mitral regurgitation on echocardiogram March 2019. Review of Systems:     Constitutional: No fever, chills, sweats  Cardiac: As per HPI  Pulmonary: As per HPI  HEENT: No visual disturbances or difficult swallowing  GI: No nausea, vomiting, diarrhea, abdominal pain, rectal bleeding  : No dysuria or hematuria  Endocrine: No excessive thirst, heat or cold intolerance. Musculoskeletal: Joint pain but no muscle aches. No claudication  Skin: No skin breakdown or rashes  Neuro: No headache, confusion, or seizures  Psych: No depression, anxiety      Family History:  No family history on file. Social History:  Social History     Socioeconomic History    Marital status:       Spouse name: Not on file    Number of children: Not on file    Years of education: Not on file    Highest education level: Not on file   Occupational History    Not on file   Tobacco Use    Smoking status: Never Smoker    Smokeless tobacco: Never Used   Substance and Sexual Activity    Alcohol use: Not Currently    Drug use: Never    Sexual activity: Not on file   Other Topics Concern    Not on file   Social History Narrative    Not on file     Social Determinants of Health     Financial Resource Strain: Low Risk     Difficulty of Paying Living Expenses: Not hard at all   Food Insecurity: No Food Insecurity    Worried About [address] in the Last Year: Never true    [facility] in the Last Year: Never true   Transportation Needs:     Lack of Transportation (Medical): Not on file    Lack of Transportation (Non-Medical):  Not on file   Physical Activity:     Days of Exercise per Week: Not on file    Minutes of Exercise per Session: Not on file   Stress:     Feeling of Stress : Not on file   Social Connections:     Frequency of Communication with Friends and Family: Not on file    Frequency of Social Gatherings with Friends and Family: Not on file    Attends [employer] Services: Not on file    Active Member of [address] [place] [address] or Organizations: Not on file    Attends Club or Organization Meetings: Not on file    Marital Status: Not on file   Intimate Partner Violence:     Fear of Current or Ex-Partner: Not on file    Emotionally Abused: Not on file    Physically Abused: Not on file    Sexually Abused: Not on file   Housing Stability:     Unable to Pay for Housing in the Last Year: Not on file    Number of [place] in the Last Year: Not on file    Unstable Housing in the Last Year: Not on file       Allergies:  No Known Allergies    Current Medications:  Current Facility-Administered Medications   Medication Dose Route Frequency Provider Last Rate Last Admin    amLODIPine (NORVASC) tablet 5 mg  5 mg Oral Daily Leodan Sunshine MD   5 mg at 05/31/22 6996  apixaban (ELIQUIS) tablet 2.5 mg  2.5 mg Oral BID Dietrich Rinne, MD   2.5 mg at 05/31/22 5194    aspirin EC tablet 81 mg  81 mg Oral Daily Dietrich Rinne, MD   81 mg at 05/31/22 8653    atorvastatin (LIPITOR) tablet 80 mg  80 mg Oral Daily Dietrich Rinne, MD        donepezil (ARICEPT) tablet 10 mg  10 mg Oral Nightly Dietrich Rinne, MD   10 mg at 05/31/22 0025    isosorbide mononitrate (IMDUR) extended release tablet 30 mg  30 mg Oral Daily Dietrich Rinne, MD   30 mg at 05/31/22 6707    meclizine (ANTIVERT) tablet 25 mg  25 mg Oral TID PRN Dietrich Rinne, MD        nitroGLYCERIN (NITROSTAT) SL tablet 0.4 mg  0.4 mg SubLINGual Q5 Min PRN Dietrich Rinne, MD        [facility]) tablet 20 mg  20 mg Oral Nightly Dietrich Rinne, MD   20 mg at 05/31/22 0025         Physical Exam:  BP (!) 120/56   Pulse 96   Temp 98.2 °F (36.8 °C) (Oral)   Resp 18   Ht 4' 10\" (1.473 m)   Wt 98 lb 9.6 oz (44.7 kg)   SpO2 96%   BMI 20.61 kg/m²   Weight change: Wt Readings from Last 3 Encounters:   05/30/22 98 lb 9.6 oz (44.7 kg)   05/30/22 94 lb (42.6 kg)   04/25/22 99 lb 12.8 oz (45.3 kg)         General: Awake, alert, oriented x3, no acute distress  HEENT: Unremarkable  Neck: No JVD or bruits. Cardiac: Irregular rate and rhythm, normal S1 and decreased intensity S2, 2-2/6 systolic murmur right and left upper sternal borders. No other extra heart sounds, murmurs, heaves, thrills  Resp: Lungs decreased at the bases bilaterally without wheezing or crackles. No accessory muscle use or retraction  Abdomen: soft, nontender, nondistended, no gross organomegaly or mass  Skin: Warm and dry, no cyanosis. Musculoskeletal: No identified joint deformity  Neuro: Grossly unremarkable  Psych: Cooperative, and normal affect    Intake/Output:  No intake or output data in the 24 hours ending 05/31/22 0906  No intake/output data recorded.     Laboratory Tests:  Lab Results   Component Value Date    CREATININE 1.4 (H) 05/31/2022    BUN 28 (H) 05/31/2022     05/31/2022    K 4.2 05/31/2022     05/31/2022    CO2 26 05/31/2022     No results for input(s): CKTOTAL, CKMB in the last 72 hours. Invalid input(s): TROPONONI  No results found for: BNP  Lab Results   Component Value Date    WBC 4.5 05/31/2022    RBC 3.98 05/31/2022    HGB 11.4 05/31/2022    HCT 35.6 05/31/2022    MCV 89.4 05/31/2022    MCH 28.6 05/31/2022    MCHC 32.0 05/31/2022    RDW 14.3 05/31/2022     05/31/2022    MPV 11.5 05/31/2022     Recent Labs     05/30/22  1212   ALKPHOS 42   ALT 19   AST 32*   PROT 6.8   BILITOT 1.4*   LABALBU 4.0     No results found for: MG  Lab Results   Component Value Date    PROTIME 11.9 08/01/2014    INR 1.1 08/01/2014     No results found for: TSH  No components found for: CHLPL  No results found for: TRIG  No results found for: HDL  No results found for: [address]  Lab Results   Component Value Date    INR 1.1 08/01/2014       Admission ECG reviewed by me revealed AF CVR. ASSESSMENT / PLAN:    #1.  Acute HFpEF- secondary to critical aortic stenosis noted on echocardiogram earlier today. Extensive discussion held with her daughter on the phone, Ana M Guzmán. Phone number [phone]. Attempted to reach her son [facility] AT [place], [phone]. Daughter and I discussed that the risk of transfer downtown for heart catheterization followed by TAVR is prohibitive especially for a 80pound 80year-old female with a GFR of 30 cc/min. The plan is to gently diurese at this time with 0.5 to Bumex IV now then twice daily. Hidalgo catheter and monitor creatinine. At the time of discharge, she will be placed on standing oral diuretic dose. #2.  Aortic stenosis-as above. Prohibitive risk for transfer, heart catheterization, and subsequent TAVR procedure. Medical management at this time. #3. Hypertension- blood pressure controlled.   Have not added any new antihypertensives and would avoid dropping blood pressure aggressively because of critical aortic stenosis. #4.  Hyperlipidemia-statin medication. #5.  Paroxysmal atrial fibrillation- rate currently controlled. No new antiarrhythmics added. #6. Anticoagulation status-Eliquis to be continued at the current dose. #7.  Continue to follow.     Electronically signed by Jesi Wolf MD on 5/31/2022 at 9:06 AM

## 2022-05-31 NOTE — PLAN OF CARE
Problem: Discharge Planning  Goal: Discharge to home or other facility with appropriate resources  Outcome: Progressing  Flowsheets  Taken 5/31/2022 0830 by Barbara Baez RN  Discharge to home or other facility with appropriate resources:   Identify barriers to discharge with patient and caregiver   Arrange for needed discharge resources and transportation as appropriate   Identify discharge learning needs (meds, wound care, etc)   Arrange for interpreters to assist at discharge as needed  Taken 5/30/2022 2341 by Jazzmine Corbin RN  Discharge to home or other facility with appropriate resources: Identify barriers to discharge with patient and caregiver     Problem: Safety - Adult  Goal: Free from fall injury  Outcome: Progressing  Flowsheets (Taken 5/31/2022 0830)  Free From Fall Injury:   Instruct family/caregiver on patient safety   Based on caregiver fall risk screen, instruct family/caregiver to ask for assistance with transferring infant if caregiver noted to have fall risk factors     Problem: ABCDS Injury Assessment  Goal: Absence of physical injury  Outcome: Progressing  Flowsheets (Taken 5/31/2022 0830)  Absence of Physical Injury: Implement safety measures based on patient assessment     Problem: Pain  Goal: Verbalizes/displays adequate comfort level or baseline comfort level  Outcome: Progressing

## 2022-05-31 NOTE — CARE COORDINATION
Met with pt; PCP ; uses cane; was staying with daughter locally; plan is to return to her residence at discharge, no needs. Following cardiology plan. An Dong.

## 2022-05-31 NOTE — PROGRESS NOTES
Lab called to inform that the troponin that resulted at 48 at 0030 was hemolyzed and they had to change the results to rejected.

## 2022-05-31 NOTE — H&P
History and Physical  Internal Medicine  Keegan Francis MD    CHIEF COMPLAINT:  syncope      HISTORY OF PRESENT ILLNESS:      The patient is a 80 y.o. female patient of Dr Joanna Bell who presents with as per ER - Ema Yaritza is a 80 y.o. female presenting to the ED for shortness of breath with minimal exertion, beginning one week ago. The complaint has been persistent, moderate in severity, and worsened by light exertion. States she can't even put her clothes on without get short of breath. She reports Tuesday she was shopping and became short of breath and sat down and apparently had a brief LOC because the next thing she knew the EMS was there. Per daughter patient has history of afib per cardiologist and takes Eliquis BID for it (though not in Epic to verify). Patient denies fever/chills, sore throat, cough, congestion, chest pain, edema, headache, visual disturbances, focal paresthesias, focal weakness, abdominal pain, nausea, vomiting, diarrhea, constipation, dysuria, hematuria, trauma, neck or back pain, rash or other complaints. No changes in her medication regimen. Patient unable to speak more than 3-4 words without becoming dyspneic. Past Medical History:   Diagnosis Date    Hip fracture, right (HCC)     Hyperlipidemia     Hypertension            Past Surgical History:   Procedure Laterality Date    CORONARY ARTERY BYPASS GRAFT      HYSTERECTOMY      JOINT REPLACEMENT      hip       Medications Prior to Admission:    Medications Prior to Admission: Vibegron 75 MG TABS, Take by mouth  donepezil (ARICEPT) 10 MG tablet, Take 1 tablet by mouth nightly  apixaban (ELIQUIS) 2.5 MG TABS tablet, Take by mouth 2 times daily  isosorbide mononitrate (IMDUR) 30 MG extended release tablet, Take 30 mg by mouth daily  amLODIPine (NORVASC) 5 MG tablet, Take 5 mg by mouth daily  atorvastatin (LIPITOR) 80 MG tablet, Take 80 mg by mouth daily.   aspirin 81 MG tablet, Take 81 mg by mouth daily.  meclizine (ANTIVERT) 12.5 MG tablet, Take 25 mg by mouth 3 times daily as needed   nitroGLYCERIN (NITROSTAT) 0.4 MG SL tablet, Place 0.4 mg under the tongue every 5 minutes as needed for Chest pain. Allergies:    Patient has no known allergies. Social History:    reports that she has never smoked. She has never used smokeless tobacco. She reports previous alcohol use. She reports that she does not use drugs. Family History:   family history is not on file. REVIEW OF SYSTEMS:  As above in the HPI, otherwise negative    Vital Signs:  BP (!) 143/68   Pulse 79   Temp 97.9 °F (36.6 °C) (Oral)   Resp 18   Ht 4' 10\" (1.473 m)   Wt 98 lb 9.6 oz (44.7 kg)   SpO2 94%   BMI 20.61 kg/m²     Physical Exam:    General appearance: alert, appears stated age and cooperative  Head: Normocephalic, without obvious abnormality, atraumatic  Eyes: conjunctivae/corneas clear. PERRL, EOM's intact. Fundi benign.   Throat: lips, mucosa, and tongue normal; teeth and gums normal  Neck: no adenopathy, no carotid bruit, no JVD and supple, symmetrical, trachea midline  Back: negative  Lungs: clear to auscultation bilaterally  Chest wall: no tenderness  Heart: regular rate and rhythm and systolic murmur: holosystolic 3/6, blowing throughout the precordium  Abdomen: soft, non-tender; bowel sounds normal; no masses,  no organomegaly  Extremities: extremities normal, atraumatic, no cyanosis or edema  Pulses: 2+ and symmetric  Skin: Skin color, texture, turgor normal. No rashes or lesions  Lymph nodes: Cervical, supraclavicular, and axillary nodes normal.  Neurologic: Grossly normal  Rectal: deferred    LABS:    Recent Results (from the past 24 hour(s))   EKG 12 Lead    Collection Time: 05/30/22 11:41 AM   Result Value Ref Range    Ventricular Rate 82 BPM    Atrial Rate 75 BPM    QRS Duration 106 ms    Q-T Interval 378 ms    QTc Calculation (Bazett) 441 ms    R Axis 76 degrees    T Axis -73 degrees   CBC with Auto Differential Collection Time: 05/30/22 12:12 PM   Result Value Ref Range    WBC 6.7 4.5 - 11.5 E9/L    RBC 4.02 3.50 - 5.50 E12/L    Hemoglobin 11.9 11.5 - 15.5 g/dL    Hematocrit 35.3 34.0 - 48.0 %    MCV 87.8 80.0 - 99.9 fL    MCH 29.6 26.0 - 35.0 pg    MCHC 33.7 32.0 - 34.5 %    RDW 14.3 11.5 - 15.0 fL    Platelets 801 128 - 192 E9/L    MPV 11.1 7.0 - 12.0 fL    Neutrophils % 75.9 43.0 - 80.0 %    Immature Granulocytes % 0.1 0.0 - 5.0 %    Lymphocytes % 14.4 (L) 20.0 - 42.0 %    Monocytes % 8.2 2.0 - 12.0 %    Eosinophils % 0.7 0.0 - 6.0 %    Basophils % 0.7 0.0 - 2.0 %    Neutrophils Absolute 5.09 1.80 - 7.30 E9/L    Immature Granulocytes # 0.01 E9/L    Lymphocytes Absolute 0.97 (L) 1.50 - 4.00 E9/L    Monocytes Absolute 0.55 0.10 - 0.95 E9/L    Eosinophils Absolute 0.05 0.05 - 0.50 E9/L    Basophils Absolute 0.05 0.00 - 0.20 E9/L   Comprehensive Metabolic Panel    Collection Time: 05/30/22 12:12 PM   Result Value Ref Range    Sodium 142 132 - 146 mmol/L    Potassium 4.3 3.5 - 5.0 mmol/L    Chloride 106 98 - 107 mmol/L    CO2 23 22 - 29 mmol/L    Anion Gap 13 7 - 16 mmol/L    Glucose 102 (H) 74 - 99 mg/dL    BUN 27 (H) 6 - 23 mg/dL    CREATININE 1.2 (H) 0.5 - 1.0 mg/dL    GFR Non-African American 42 >=60 mL/min/1.73    GFR African American 51     Calcium 10.1 8.6 - 10.2 mg/dL    Total Protein 6.8 6.4 - 8.3 g/dL    Albumin 4.0 3.5 - 5.2 g/dL    Total Bilirubin 1.4 (H) 0.0 - 1.2 mg/dL    Alkaline Phosphatase 42 35 - 104 U/L    ALT 19 0 - 32 U/L    AST 32 (H) 0 - 31 U/L   Brain Natriuretic Peptide    Collection Time: 05/30/22 12:12 PM   Result Value Ref Range    Pro-BNP 14,791 (H) 0 - 450 pg/mL   Troponin    Collection Time: 05/30/22 12:12 PM   Result Value Ref Range    Troponin, High Sensitivity 42 (H) 0 - 9 ng/L   COVID-19 & Influenza Combo    Collection Time: 05/30/22 12:12 PM    Specimen: Nasopharyngeal Swab   Result Value Ref Range    SARS-CoV-2 RNA, RT PCR NOT DETECTED NOT DETECTED    INFLUENZA A NOT DETECTED NOT DETECTED    INFLUENZA B NOT DETECTED NOT DETECTED   Lactic Acid    Collection Time: 22 12:35 PM   Result Value Ref Range    Lactic Acid 1.1 0.5 - 2.2 mmol/L   SPECIMEN REJECTION    Collection Time: 22  1:45 AM   Result Value Ref Range    Rejected Test trop     Reason for Rejection see below        Radiology:     Chest  Xray:  Results for orders placed during the hospital encounter of 20    XR CHEST STANDARD (2 VW)    Narrative  Patient MRN:  [id number]  : 1927  Age: 80 years  Gender: Female    Order Date:  2020 8:30 PM    EXAM: XR CHEST (2 VW)    COMPARISON: 2014    INDICATION:  near syncope  near syncope    FINDINGS:    The heart is normal in size. Median sternotomy wires are present. No  focal airspace opacity. There is no pleural effusion. There is no  pneumothorax. No free air beneath the diaphragms. Impression  No airspace opacities or pleural effusion. Results for orders placed during the hospital encounter of 22    XR CHEST PORTABLE    Narrative  EXAMINATION:  ONE XRAY VIEW OF THE CHEST    2022 10:11 am    HISTORY:  ORDERING SYSTEM PROVIDED HISTORY: sob  TECHNOLOGIST PROVIDED HISTORY:  Reason for exam:->sob    FINDINGS:  The heart is enlarged. There are bibasilar opacities. There is blunting of  the costophrenic angles, worse on the left. There are median sternotomy  wires. No pneumothorax. Impression  CHF bilateral pleural effusion. Superimposed infiltrates at the lung bases  cannot be excluded. Other:  none      ASSESSMENT:      Principal Problem:    Chest pain  Active Problems:    Benign hypertension    Hyperlipemia    ASCVD (arteriosclerotic cardiovascular disease)    Atrial fibrillation (HCC)    Syncope  Resolved Problems:    * No resolved hospital problems.  *      PLAN:    A little hiccup in getting our troponins  Had anticipated a quick turnaround at 89yo not sure we are looking to reinvent the wheel - will optimize her medical regimen keep comfortable and get her back with her family  Nonetheless will ask cardio to see    Thong Franklin MD  5:54 AM  5/31/2022

## 2022-05-31 NOTE — PROGRESS NOTES
Patient known to Dr Mk Matthews, will defer consult to them. Electronically signed by Zane Sexton.  Valentino Rings, APN on 5/31/2022 at 8:23 AM

## 2022-06-01 VITALS
OXYGEN SATURATION: 98 % | SYSTOLIC BLOOD PRESSURE: 121 MMHG | DIASTOLIC BLOOD PRESSURE: 51 MMHG | RESPIRATION RATE: 18 BRPM | BODY MASS INDEX: 19.44 KG/M2 | WEIGHT: 92.59 LBS | TEMPERATURE: 98.3 F | HEIGHT: 58 IN | HEART RATE: 72 BPM

## 2022-06-01 LAB
ANION GAP SERPL CALCULATED.3IONS-SCNC: 16 MMOL/L (ref 7–16)
BASOPHILS ABSOLUTE: 0.04 E9/L (ref 0–0.2)
BASOPHILS RELATIVE PERCENT: 0.8 % (ref 0–2)
BUN BLDV-MCNC: 37 MG/DL (ref 6–23)
CALCIUM SERPL-MCNC: 9.7 MG/DL (ref 8.6–10.2)
CHLORIDE BLD-SCNC: 100 MMOL/L (ref 98–107)
CO2: 27 MMOL/L (ref 22–29)
CREAT SERPL-MCNC: 1.9 MG/DL (ref 0.5–1)
EOSINOPHILS ABSOLUTE: 0.1 E9/L (ref 0.05–0.5)
EOSINOPHILS RELATIVE PERCENT: 2 % (ref 0–6)
GFR AFRICAN AMERICAN: 30
GFR NON-AFRICAN AMERICAN: 25 ML/MIN/1.73
GLUCOSE BLD-MCNC: 94 MG/DL (ref 74–99)
HCT VFR BLD CALC: 37.2 % (ref 34–48)
HEMOGLOBIN: 11.9 G/DL (ref 11.5–15.5)
IMMATURE GRANULOCYTES #: 0.01 E9/L
IMMATURE GRANULOCYTES %: 0.2 % (ref 0–5)
LYMPHOCYTES ABSOLUTE: 0.97 E9/L (ref 1.5–4)
LYMPHOCYTES RELATIVE PERCENT: 19.5 % (ref 20–42)
MCH RBC QN AUTO: 28.7 PG (ref 26–35)
MCHC RBC AUTO-ENTMCNC: 32 % (ref 32–34.5)
MCV RBC AUTO: 89.6 FL (ref 80–99.9)
MONOCYTES ABSOLUTE: 0.5 E9/L (ref 0.1–0.95)
MONOCYTES RELATIVE PERCENT: 10.1 % (ref 2–12)
NEUTROPHILS ABSOLUTE: 3.35 E9/L (ref 1.8–7.3)
NEUTROPHILS RELATIVE PERCENT: 67.4 % (ref 43–80)
PDW BLD-RTO: 14.4 FL (ref 11.5–15)
PLATELET # BLD: 209 E9/L (ref 130–450)
PMV BLD AUTO: 11.4 FL (ref 7–12)
POTASSIUM SERPL-SCNC: 3.5 MMOL/L (ref 3.5–5)
RBC # BLD: 4.15 E12/L (ref 3.5–5.5)
SODIUM BLD-SCNC: 143 MMOL/L (ref 132–146)
TROPONIN, HIGH SENSITIVITY: 62 NG/L (ref 0–9)
WBC # BLD: 5 E9/L (ref 4.5–11.5)

## 2022-06-01 PROCEDURE — 84484 ASSAY OF TROPONIN QUANT: CPT

## 2022-06-01 PROCEDURE — 85025 COMPLETE CBC W/AUTO DIFF WBC: CPT

## 2022-06-01 PROCEDURE — 2580000003 HC RX 258: Performed by: INTERNAL MEDICINE

## 2022-06-01 PROCEDURE — 6370000000 HC RX 637 (ALT 250 FOR IP): Performed by: INTERNAL MEDICINE

## 2022-06-01 PROCEDURE — G0378 HOSPITAL OBSERVATION PER HR: HCPCS

## 2022-06-01 PROCEDURE — 36415 COLL VENOUS BLD VENIPUNCTURE: CPT

## 2022-06-01 PROCEDURE — 80048 BASIC METABOLIC PNL TOTAL CA: CPT

## 2022-06-01 RX ORDER — SODIUM CHLORIDE 9 MG/ML
INJECTION, SOLUTION INTRAVENOUS CONTINUOUS
Status: ACTIVE | OUTPATIENT
Start: 2022-06-01 | End: 2022-06-01

## 2022-06-01 RX ADMIN — APIXABAN 2.5 MG: 2.5 TABLET, FILM COATED ORAL at 08:50

## 2022-06-01 RX ADMIN — AMLODIPINE BESYLATE 5 MG: 5 TABLET ORAL at 08:50

## 2022-06-01 RX ADMIN — SODIUM CHLORIDE: 9 INJECTION, SOLUTION INTRAVENOUS at 08:52

## 2022-06-01 RX ADMIN — ATORVASTATIN CALCIUM 80 MG: 40 TABLET, FILM COATED ORAL at 08:50

## 2022-06-01 RX ADMIN — ASPIRIN 81 MG: 81 TABLET, COATED ORAL at 08:50

## 2022-06-01 RX ADMIN — ISOSORBIDE MONONITRATE 30 MG: 30 TABLET, EXTENDED RELEASE ORAL at 08:49

## 2022-06-01 NOTE — PROGRESS NOTES
Internal Medicine  Progress Note  Cassia Christopher MD     Subjective:     Patient is alert. Patient reports OK. Tolerating diet well no other c/o. Scheduled Meds:   bumetanide  1 mg IntraVENous BID    amLODIPine  5 mg Oral Daily    apixaban  2.5 mg Oral BID    aspirin  81 mg Oral Daily    atorvastatin  80 mg Oral Daily    donepezil  10 mg Oral Nightly    isosorbide mononitrate  30 mg Oral Daily    trospium  20 mg Oral Nightly     Continuous Infusions:  PRN Meds:perflutren lipid microspheres, meclizine    Objective:      Physical Exam:  Vitals:   BP (!) 125/90   Pulse 90   Temp 98.6 °F (37 °C) (Oral)   Resp 18   Ht 4' 10\" (1.473 m)   Wt 92 lb (41.7 kg)   SpO2 98%   BMI 19.23 kg/m²   Orthostatic BPs and Heart Rates:     I/O's    Intake/Output Summary (Last 24 hours) at 6/1/2022 3389  Last data filed at 6/1/2022 0543  Gross per 24 hour   Intake --   Output 850 ml   Net -850 ml     Exam:  General appearance: alert, appears stated age and cooperative  Head: Normocephalic, without obvious abnormality, atraumatic  Eyes: conjunctivae/corneas clear. PERRL, EOM's intact. Fundi benign. Throat: lips, mucosa, and tongue normal; teeth and gums normal  Neck: no adenopathy, no carotid bruit, no JVD, supple, symmetrical, trachea midline and thyroid not enlarged, symmetric, no tenderness/mass/nodules  Back: symmetric, no curvature. ROM normal. No CVA tenderness.   Lungs: clear to auscultation bilaterally  Chest wall: no tenderness  Heart: RRR KETURAH  Abdomen: soft, non-tender; bowel sounds normal; no masses,  no organomegaly  Extremities: extremities normal, atraumatic, no cyanosis or edema  Pulses: 2+ and symmetric  Skin: Skin color, texture, turgor normal. No rashes or lesions  Lymph nodes: Cervical, supraclavicular, and axillary nodes normal.  Neurologic: Grossly normal      Imaging     Chest  Xray:  Results for orders placed during the hospital encounter of 05/19/20    XR CHEST STANDARD (2 VW)    Narrative  Patient MRN:  [id number]  : 1927  Age: 80 years  Gender: Female    Order Date:  2020 8:30 PM    EXAM: XR CHEST (2 VW)    COMPARISON: 2014    INDICATION:  near syncope  near syncope    FINDINGS:    The heart is normal in size. Median sternotomy wires are present. No  focal airspace opacity. There is no pleural effusion. There is no  pneumothorax. No free air beneath the diaphragms. Impression  No airspace opacities or pleural effusion. Results for orders placed during the hospital encounter of 22    XR CHEST PORTABLE    Narrative  EXAMINATION:  ONE XRAY VIEW OF THE CHEST    2022 10:11 am    HISTORY:  ORDERING SYSTEM PROVIDED HISTORY: sob  TECHNOLOGIST PROVIDED HISTORY:  Reason for exam:->sob    FINDINGS:  The heart is enlarged. There are bibasilar opacities. There is blunting of  the costophrenic angles, worse on the left. There are median sternotomy  wires. No pneumothorax. Impression  CHF bilateral pleural effusion. Superimposed infiltrates at the lung bases  cannot be excluded.       Additional Imaging:  none    Lab Review   Recent Results (from the past 24 hour(s))   Troponin    Collection Time: 22 11:10 AM   Result Value Ref Range    Troponin, High Sensitivity 55 (H) 0 - 9 ng/L   Troponin    Collection Time: 22  6:32 PM   Result Value Ref Range    Troponin, High Sensitivity 56 (H) 0 - 9 ng/L   Troponin    Collection Time: 22 10:42 PM   Result Value Ref Range    Troponin, High Sensitivity 59 (H) 0 - 9 ng/L   Troponin    Collection Time: 22  3:47 AM   Result Value Ref Range    Troponin, High Sensitivity 62 (H) 0 - 9 ng/L   CBC with Auto Differential    Collection Time: 22  3:47 AM   Result Value Ref Range    WBC 5.0 4.5 - 11.5 E9/L    RBC 4.15 3.50 - 5.50 E12/L    Hemoglobin 11.9 11.5 - 15.5 g/dL    Hematocrit 37.2 34.0 - 48.0 %    MCV 89.6 80.0 - 99.9 fL    MCH 28.7 26.0 - 35.0 pg    MCHC 32.0 32.0 - 34.5 %    RDW 14.4 11.5 - 15.0 fL    Platelets 768 800 - 247 E9/L    MPV 11.4 7.0 - 12.0 fL    Neutrophils % 67.4 43.0 - 80.0 %    Immature Granulocytes % 0.2 0.0 - 5.0 %    Lymphocytes % 19.5 (L) 20.0 - 42.0 %    Monocytes % 10.1 2.0 - 12.0 %    Eosinophils % 2.0 0.0 - 6.0 %    Basophils % 0.8 0.0 - 2.0 %    Neutrophils Absolute 3.35 1.80 - 7.30 E9/L    Immature Granulocytes # 0.01 E9/L    Lymphocytes Absolute 0.97 (L) 1.50 - 4.00 E9/L    Monocytes Absolute 0.50 0.10 - 0.95 E9/L    Eosinophils Absolute 0.10 0.05 - 0.50 E9/L    Basophils Absolute 0.04 0.00 - 0.20 Y7/I   Basic Metabolic Panel    Collection Time: 06/01/22  3:47 AM   Result Value Ref Range    Sodium 143 132 - 146 mmol/L    Potassium 3.5 3.5 - 5.0 mmol/L    Chloride 100 98 - 107 mmol/L    CO2 27 22 - 29 mmol/L    Anion Gap 16 7 - 16 mmol/L    Glucose 94 74 - 99 mg/dL    BUN 37 (H) 6 - 23 mg/dL    CREATININE 1.9 (H) 0.5 - 1.0 mg/dL    GFR Non-African American 25 >=60 mL/min/1.73    GFR African American 30     Calcium 9.7 8.6 - 10.2 mg/dL     Assessment:     Principal Problem:    Chest pain  Active Problems:    Benign hypertension    Hyperlipemia    ASCVD (arteriosclerotic cardiovascular disease)    Atrial fibrillation (HCC)    Syncope    Acute congestive heart failure (HCC)  Resolved Problems:    * No resolved hospital problems.  *      Plan:   Looks great - her creatinine not so much - big bump in that   Stop diuresis  George Chiang MD  6/1/2022  6:13 AM

## 2022-06-01 NOTE — CONSULTS
Patient currently admitted with diagnosis of Diastolic heart failure. Patient was awake and alert, laying in bed during the consultation. She was engaged and asked appropriate questions throughout the education session. She is agreeable to heart failure education while here and follow up with Dr Nikolai Vieira after discharge. Scheduling with the CHF clinic No: The patient wants to follow up with her cardiologist after discharge. .    Future Appointments   Date Time Provider Lashell Blanchardi   10/3/2022  1:15 PM Tommie Blunt MD [facility]            We reviewed the introduction to Heart Failure, the HF zones, signs and symptoms to report on day 1 of onset, medications, medication compliance, the importance of obtaining daily weights, following a low sodium diet, reading food labels for the sodium content, keeping physician appointments, and smoking cessation. We discussed writing / tracking daily weights on a calendar / log because a 5 pound gain in 1 week can sneak up if you are not tracking it. Contributing risk factors for Heart Failure are identified as shortness of breath . I advised patient they can reduce the risk for Heart Failure exacerbations by modifying / controlling the risk factors. We discussed self-managed care which includes the following:  to take medications as prescribed, report any intolerable side effects of medications to the cardiologist / doctor, do not just stop taking the medication; follow a cardiac heart healthy / low sodium diet; weigh yourself daily, exercise regularly- per doctor recommendation and not to smoke or use an excess amount of alcohol. We discussed calling the cardiologist / doctor with a weight gain of 3 pounds in one day or a total of 5 pounds or more in one week. Also, if you should have a significant weight loss of 3# or more in one day to call the doctor, they may need to decrease or hold the diuretic dose.  On days you feels nauseated and not eating / drinking, having emesis or diarrhea,  informed to call the cardiologist  / doctor, they may need to decrease or hold diuretic to avoid dehydration. I stressed the importance of informing their cardiologist the first day of onset of any of the signs and symptoms in the \"Yellow Zone\" of the HF Zones. Patient verbalizes understanding. Greater than 30 minutes was spent educating patient. The Heart Failure Booklet given to the patient with additional patient education addressing:  · What is Heart Failure? · Things You Can Do to Live Well with HF  · How to Take Your Medications  · How to Eat Less Salt  · [place] its Salt? · Exercising Well with Heart Failure  · Signs and symptoms of HF to report  · Weight Yourself Each Day  · Home Self Management- activity, weight tracking, taking medications as prescribed, meals /diet planning (sodium and fluid restriction), how to read food labels, keeping physician follow ups, smoking cessation, follow the Heart Failure Zones  · The Heart Failure zones  · Every Dose Every Day      Instructed  to call 911 if you have any of the following symptoms:    ·    Struggling to breathe unrelieved with rest,  ·    Having chest pain  ·    Have confusion or cant think clearly      The patient is ordered:  Diet: ADULT DIET;  Regular; Low Fat/Low Chol/High Fiber/MORGAN   Sodium controlled diet Yes  Fluid restriction daily ordered (fluid restriction recommended if patient is hyponatremic and/or diuretic is initiated or increased) No  FR:   Daily Weights:   Patient Vitals for the past 96 hrs (Last 3 readings):   Weight   06/01/22 1030 92 lb 9.5 oz (42 kg)   06/01/22 0422 92 lb (41.7 kg)   05/30/22 2045 98 lb 9.6 oz (44.7 kg)     I/O:     Intake/Output Summary (Last 24 hours) at 6/1/2022 1454  Last data filed at 6/1/2022 1132  Gross per 24 hour   Intake --   Output 900 ml   Net -900 ml            Breanne Altman RN BSN, RN  Heart Failure Navigator      CONGESTIVE HEART FAILURE (CHF) AHA GUIDELINES  (Must be completed for Primary Diagnosis CHF or History of CHF)    Discharge Plan:  I placed the Heart Failure Home Instructions in patient's discharge instructions. Per Heart Failure [facility], the patient should have a follow-up appointment made within 7 days of discharge.     New Diagnosis No    ECHO Results most recent:  No results found for: LVEF, LVEFMODE                                     Social History     Tobacco Use   Smoking Status Never Smoker   Smokeless Tobacco Never Used        Immunization History   Administered Date(s) Administered    COVID-19, Pfizer Purple top, DILUTE for use, 12+ yrs, 30mcg/0.3mL dose 01/22/2021, 02/24/2021    Tdap (Boostrix, Adacel) 07/11/2021          Angiotensin-Converting-Enzyme (ACE) inhibitor ordered:  [] Yes  [x] No (specify contraindication):    [] Contraindicated  [] Hypotensive patient who was at immediate risk of cardiogenic shock  [] Hospitalized patient who experienced marked azotemia  [] Other Contraindications  [] Not Eligible  [] Not Tolerant  [] Patient Reason  [] System Reason  [x] Other Reason    Angiotensin II receptor blockers (ARB) ordered:  [] Yes  [x] No (specify contraindication):    [] Contraindicated  [] Hypotensive patient who was at immediate risk of cardiogenic shock  [] Hospitalized patient who experienced marked azotemia  [x] Other Contraindications    ARNI - Angiotensin Receptor Neprilysin Inhibitor ordered:  [] Yes  [x] No (specify contraindication):    [] ACE inhibitor use within the prior 36 hours  [] Allergy  [] Hyperkalemia  [] Hypotension  [] Renal dysfunction defined as creatinine > 2.5 mg/dL in men or > 2.0 mg/dL in women  [] Other Contraindications  [] Not Eligible  [] Not Tolerant  [] Patient Reason  []System Reason  [x]Other Reason      Beta Blocker (Carvedilol, Metoprolol Succinate, or Bisoprolol) ordered:    [] Yes  [x] No (specify contraindication):    [] Contraindicated  [] Asthma  [] Fluid Overload  [] Low Blood Pressure  [] Patient recently treated with an intravenous positive inotropic agent  [x] Other Contraindications  [] Not Eligible  [] Not Tolerant  [] Patient Reason  [] System Reason    SGLT2 Inhibitor ordered:  [] Yes  [x] No (specify contraindication):    [] Contraindicated  [] Patient currently on dialysis  [] Ketoacidosis  [] Known hypersensitivity to the medication  [] Type I diabetes (not approved for use in patients with Type I diabetes due to increased risk of ketoacidosis)  [] Other Contraindications  [] Not Eligible  [] Not Tolerant  [] Patient Reason  [] System Reason  [x] Other Reason    Aldosterone Antagonist ordered:  [] Yes  [x] No (specify contraindication):    [] Contraindicated  [] Allergy due to aldosterone receptor antagonist  [] Hyperkalemia  [] Renal dysfunction defined as creatinine >2.5 mg/dL in men or >2.0 mg/dL in women.   [] Other contraindications  [] Not Eligible  [] Not Tolerant  [] Patient Reason  [] System Reason  [x] Other Reason

## 2022-06-01 NOTE — CARE COORDINATION
Cardiology input noted. bun 37 cr 1.9. plan at discharge per pt is home rto her residence, no needs. Ronda Kidney.

## 2022-06-01 NOTE — PLAN OF CARE
Patient's chart updated to reflect:      . - HF care plan, HF education points and HF discharge instructions.  -Orders: 2 gram sodium diet, daily weights, I/O.  -PCP and cardiology follow up appointments to be scheduled within 7 days of hospital discharge. -CHF education session will be provided to the patient prior to hospital discharge.     Julieta Faith RN BSN  Heart Failure Navigator

## 2022-06-01 NOTE — PROGRESS NOTES
DAILY PROGRESS NOTE -[facility] CARDIOLOGY    SUBJECTIVE:    Being followed for pulmonary edema on the basis of critical aortic stenosis. Feeling much better and looking very good at this time. Creatinine increased from 1.4 baseline to 1.9. IV bumetanide stopped. Echo after admission showed normal ejection fraction, critical aortic stenosis with mean aortic gradient 60 mmHg, moderately severe mitral regurgitation. Extensive discussion with her daughter and the patient and conservative management for aortic stenosis is recommended. Hypertension, hyperlipidemia, CAD post CABG 2006, paroxysmal atrial fibrillation. On Eliquis anticoagulation. OBJECTIVE:    Her vital signs were reviewed today. Vitals:    05/31/22 2015   BP: (!) 125/90   Pulse: 90   Resp: 18   Temp: 98.6 °F (37 °C)   SpO2: 98%       Scheduled Meds:   amLODIPine  5 mg Oral Daily    apixaban  2.5 mg Oral BID    aspirin  81 mg Oral Daily    atorvastatin  80 mg Oral Daily    donepezil  10 mg Oral Nightly    isosorbide mononitrate  30 mg Oral Daily    trospium  20 mg Oral Nightly     Continuous Infusions:  PRN Meds:.perflutren lipid microspheres, meclizine    REVIEW OF SYSTEMS:     No pruritus, rash, bruising. Cardiac symptoms per HPI. No nausea, vomiting, abdominal pain, GI bleeding, change in bowel habits. No dysuria, urinary frequency, urgency, hematuria, flank pain. Joint pain but no muscle soreness, stiffness, aching. No headache, speech disturbance, lateralizing neurologic deficit. No hemoptysis, epistaxis, easy bruising. No anxiety, depression. No symptoms of hypothyroidism, hyperthyroidism, diabetes, heat or cold intolerance. FAMILY HISTORY: Negative for CAD in first-degree relatives. SOCIAL HISTORY: Negative for alcohol, tobacco, or illicit drug use. PHYSICAL EXAM:    General: Awake, alert, oriented x3, no acute distress  HEENT: Unremarkable  Neck: No JVD or bruits.   Cardiac: Irregular rate and rhythm, normal S1 and decreased intensity S2, 0-6/9 systolic murmur right and left upper sternal borders. No other extra heart sounds, murmurs, heaves, thrills  Resp: Lungs decreased at the bases bilaterally without wheezing or crackles. No accessory muscle use or retraction  Abdomen: soft, nontender, nondistended, no gross organomegaly or mass  Skin: Warm and dry, no cyanosis. Musculoskeletal: No identified joint deformity  Neuro: Grossly unremarkable  Psych: Cooperative, and normal affect    LABS:    Recent Labs     05/30/22  1212 05/31/22  0514 06/01/22  0347    144 143   CREATININE 1.2* 1.4* 1.9*       Recent Labs     05/30/22  1212 05/31/22  0514 06/01/22  0347   HGB 11.9 11.4* 11.9       No results for input(s): INR in the last 72 hours. IMPRESSION:    #1.  Acute HFpEF-secondary to critical aortic stenosis. Now euvolemic but increased her creatinine with IV diuresis. Hold off on any further diuretic for the next couple of days and then discharge on oral bumetanide 1 mg daily. Okay with me if she goes home today with plans for starting oral bumetanide on Ying 3. Defer to Dr. Sonya Hoff. #2.  Critical aortic stenosis-not a candidate for cardiac catheterization followed by TAVR due to prohibitive risk benefit ratio. Medical management at this time. #3. Hypertension, hyperlipidemia, coronary disease- blood pressure controlled. No new antihypertensives added. Continue statin. Not clear why we are checking troponin as she is not a candidate for stress testing or heart catheterization. #4.  Paroxysmal atrial fibrillation and anticoagulation status- rate controlled. Continue low-dose Eliquis. #5.  As above, acceptable from my standpoint for her to be discharged today and start low-dose daily bumetanide on Ying 3. Sign off and I have arranged follow-up with Dr. Deyvi Medrano in the office in approximately 6 weeks.

## 2022-06-19 NOTE — PROGRESS NOTES
Physician Progress Note      Cari Robertson  CSN #:                  [id number]  :                       1927  ADMIT DATE:       2022 8:45 PM  [address] [place] DATE:        2022 3:18 PM  RESPONDING  PROVIDER #:        David Conn MD          QUERY TEXT:    Pt admitted with CHF. Pt noted to have increased in Creat. If possible, please   document in the progress notes and discharge summary if you are evaluating   and/or treating any of the following: The medical record reflects the following:  Risk Factors: CHF, advanced age  Clinical Indicators:  Creat 1.2,  Creat 1.9, and per IM note \". [place] Looks   great - her creatinine not so much - big bump in that  Stop diuresis. [place] \", and per Cardiology note \". [place] Creatinine increased from 1.4   baseline to 1.9. IV bumetanide stopped. [place] \"  Treatment: serial lab monitoring, IV Bumex stopped    Defined by Kidney Disease Improving Global Outcomes (KDIGO) clinical practice   guideline for acute kidney injury:  -Increase in SCr by greater than or equal to 0.3 mg/dl within 48 hours; or  -Increase or decrease in SCr to greater than or equal to 1.5 times baseline,   which is known or presumed to have occurred within the prior 7 days; or  -Urine volume < 0.5ml/kg/h for 6 hours    Thank you,  Valery KAY, RN, CDIS  Clinical Documentation Improvement  [email or web address]  [phone]  Options provided:  -- Acute kidney injury  -- Other - I will add my own diagnosis  -- Disagree - Not applicable / Not valid  -- Disagree - Clinically unable to determine / Unknown  -- Refer to Clinical Documentation Reviewer    PROVIDER RESPONSE TEXT:    This patient has an Acute kidney injury.     Query created by: Jameson Schrader on 2022 10:23 AM      Electronically signed by:  David Conn MD 2022 8:37 AM

## 2022-10-03 ENCOUNTER — OFFICE VISIT (OUTPATIENT)
Dept: GERIATRIC MEDICINE | Age: 87
End: 2022-10-03
Payer: MEDICARE

## 2022-10-03 VITALS
RESPIRATION RATE: 22 BRPM | BODY MASS INDEX: 19.69 KG/M2 | WEIGHT: 94.2 LBS | SYSTOLIC BLOOD PRESSURE: 140 MMHG | TEMPERATURE: 97.5 F | HEART RATE: 60 BPM | DIASTOLIC BLOOD PRESSURE: 64 MMHG

## 2022-10-03 DIAGNOSIS — G30.9 ALZHEIMER DISEASE (HCC): Primary | ICD-10-CM

## 2022-10-03 DIAGNOSIS — F02.80 ALZHEIMER DISEASE (HCC): Primary | ICD-10-CM

## 2022-10-03 PROCEDURE — 1123F ACP DISCUSS/DSCN MKR DOCD: CPT | Performed by: INTERNAL MEDICINE

## 2022-10-03 PROCEDURE — 99213 OFFICE O/P EST LOW 20 MIN: CPT | Performed by: INTERNAL MEDICINE

## 2022-10-03 PROCEDURE — 99212 OFFICE O/P EST SF 10 MIN: CPT | Performed by: INTERNAL MEDICINE

## 2022-10-03 ASSESSMENT — PATIENT HEALTH QUESTIONNAIRE - PHQ9
SUM OF ALL RESPONSES TO PHQ QUESTIONS 1-9: 0
1. LITTLE INTEREST OR PLEASURE IN DOING THINGS: 0
2. FEELING DOWN, DEPRESSED OR HOPELESS: 0
SUM OF ALL RESPONSES TO PHQ QUESTIONS 1-9: 0
SUM OF ALL RESPONSES TO PHQ9 QUESTIONS 1 & 2: 0

## 2022-10-03 ASSESSMENT — LIFESTYLE VARIABLES: HOW OFTEN DO YOU HAVE A DRINK CONTAINING ALCOHOL: NEVER

## 2022-10-03 NOTE — PROGRESS NOTES
Chief Complaint   Patient presents with    Follow-up     Last seen in April re: Alzheimer's. Here with daughter, Letty Casiano. Other     Pt states she feels like her left side is \"deteriorating\". States left finger tips feels numb, left shoulder is lower than right, very seldom rolls onto her left side now-- unsure how long this has been happening. Blood Pressure Check     154/92. Repeat 140/64. Flu Vaccine     Flu vaccine offered and declined. Above issues discussed with Dr Mehreen Peralta prior to his seeing the pt.

## 2022-10-03 NOTE — PROGRESS NOTES
CC Evaluate memory    Here with daughter Pato Reeder   And animated   Sleeps 8 hours   Doing laundry  Family doing all IADL's  She may be be sleeping and doing ADL's  Daughter diverts mail   Al Cuevas my resident was a [place]  Unable to add 41 cents\  Sitting erect and using Quad cane   ATP   __ __ __   EETFBB, early mac D,   Animals 16 with cluing   /80 recheck  Impression[de-identified] Early SDAT after head injury and anesthesia                       A Fib on Eliquis 2.5 mg bid    Plan: Aricept 10 mg a day

## 2023-01-16 RX ORDER — DONEPEZIL HYDROCHLORIDE 10 MG/1
10 TABLET, FILM COATED ORAL NIGHTLY
Qty: 90 TABLET | Refills: 3 | Status: SHIPPED | OUTPATIENT
Start: 2023-01-16

## 2023-06-26 ENCOUNTER — APPOINTMENT (OUTPATIENT)
Dept: GENERAL RADIOLOGY | Age: 88
End: 2023-06-26
Payer: MEDICARE

## 2023-06-26 ENCOUNTER — HOSPITAL ENCOUNTER (INPATIENT)
Age: 88
LOS: 4 days | Discharge: HOME | End: 2023-06-30
Attending: EMERGENCY MEDICINE | Admitting: INTERNAL MEDICINE
Payer: MEDICARE

## 2023-06-26 ENCOUNTER — APPOINTMENT (OUTPATIENT)
Dept: CT IMAGING | Age: 88
End: 2023-06-26
Payer: MEDICARE

## 2023-06-26 DIAGNOSIS — J18.9 PNEUMONIA DUE TO INFECTIOUS ORGANISM, UNSPECIFIED LATERALITY, UNSPECIFIED PART OF LUNG: Primary | ICD-10-CM

## 2023-06-26 DIAGNOSIS — R04.2 HEMOPTYSIS: ICD-10-CM

## 2023-06-26 DIAGNOSIS — J90 PLEURAL EFFUSION: ICD-10-CM

## 2023-06-26 DIAGNOSIS — N17.9 AKI (ACUTE KIDNEY INJURY) (HCC): ICD-10-CM

## 2023-06-26 DIAGNOSIS — I48.20 CHRONIC ATRIAL FIBRILLATION (HCC): ICD-10-CM

## 2023-06-26 DIAGNOSIS — I51.7 CARDIOMEGALY: ICD-10-CM

## 2023-06-26 LAB
ALBUMIN SERPL-MCNC: 4 G/DL (ref 3.5–5.2)
ALP SERPL-CCNC: 43 U/L (ref 35–104)
ALT SERPL-CCNC: 28 U/L (ref 0–32)
ANION GAP SERPL CALCULATED.3IONS-SCNC: 13 MMOL/L (ref 7–16)
APTT: 34.8 SEC (ref 24.5–35.1)
AST SERPL-CCNC: 37 U/L (ref 0–31)
BASOPHILS # BLD: 0.03 E9/L (ref 0–0.2)
BASOPHILS NFR BLD: 0.5 % (ref 0–2)
BILIRUB SERPL-MCNC: 2.5 MG/DL (ref 0–1.2)
BNP BLD-MCNC: ABNORMAL PG/ML (ref 0–450)
BUN SERPL-MCNC: 40 MG/DL (ref 6–23)
CALCIUM SERPL-MCNC: 10 MG/DL (ref 8.6–10.2)
CHLORIDE SERPL-SCNC: 104 MMOL/L (ref 98–107)
CO2 SERPL-SCNC: 25 MMOL/L (ref 22–29)
CREAT SERPL-MCNC: 1.3 MG/DL (ref 0.5–1)
EKG ATRIAL RATE: 85 BPM
EKG Q-T INTERVAL: 414 MS
EKG QRS DURATION: 98 MS
EKG QTC CALCULATION (BAZETT): 465 MS
EKG R AXIS: 103 DEGREES
EKG T AXIS: -54 DEGREES
EKG VENTRICULAR RATE: 76 BPM
EOSINOPHIL # BLD: 0.01 E9/L (ref 0.05–0.5)
EOSINOPHIL NFR BLD: 0.2 % (ref 0–6)
ERYTHROCYTE [DISTWIDTH] IN BLOOD BY AUTOMATED COUNT: 14.5 FL (ref 11.5–15)
GLUCOSE SERPL-MCNC: 90 MG/DL (ref 74–99)
HCT VFR BLD AUTO: 34.5 % (ref 34–48)
HGB BLD-MCNC: 11.3 G/DL (ref 11.5–15.5)
IMM GRANULOCYTES # BLD: 0.02 E9/L
IMM GRANULOCYTES NFR BLD: 0.3 % (ref 0–5)
INR BLD: 1.9
LACTATE BLDV-SCNC: 1.1 MMOL/L (ref 0.5–2.2)
LYMPHOCYTES # BLD: 0.73 E9/L (ref 1.5–4)
LYMPHOCYTES NFR BLD: 12.3 % (ref 20–42)
MCH RBC QN AUTO: 31.3 PG (ref 26–35)
MCHC RBC AUTO-ENTMCNC: 32.8 % (ref 32–34.5)
MCV RBC AUTO: 95.6 FL (ref 80–99.9)
MONOCYTES # BLD: 0.36 E9/L (ref 0.1–0.95)
MONOCYTES NFR BLD: 6.1 % (ref 2–12)
NEUTROPHILS # BLD: 4.77 E9/L (ref 1.8–7.3)
NEUTS SEG NFR BLD: 80.6 % (ref 43–80)
PLATELET # BLD AUTO: 206 E9/L (ref 130–450)
PMV BLD AUTO: 10.7 FL (ref 7–12)
POTASSIUM SERPL-SCNC: 4.2 MMOL/L (ref 3.5–5)
PROT SERPL-MCNC: 6.8 G/DL (ref 6.4–8.3)
PROTHROMBIN TIME: 21.2 SEC (ref 9.3–12.4)
RBC # BLD AUTO: 3.61 E12/L (ref 3.5–5.5)
SODIUM SERPL-SCNC: 142 MMOL/L (ref 132–146)
TROPONIN, HIGH SENSITIVITY: 43 NG/L (ref 0–9)
TROPONIN, HIGH SENSITIVITY: 44 NG/L (ref 0–9)
WBC # BLD: 5.9 E9/L (ref 4.5–11.5)

## 2023-06-26 PROCEDURE — 71045 X-RAY EXAM CHEST 1 VIEW: CPT

## 2023-06-26 PROCEDURE — 6370000000 HC RX 637 (ALT 250 FOR IP): Performed by: FAMILY MEDICINE

## 2023-06-26 PROCEDURE — 84484 ASSAY OF TROPONIN QUANT: CPT

## 2023-06-26 PROCEDURE — 83605 ASSAY OF LACTIC ACID: CPT

## 2023-06-26 PROCEDURE — 2060000000 HC ICU INTERMEDIATE R&B

## 2023-06-26 PROCEDURE — 96375 TX/PRO/DX INJ NEW DRUG ADDON: CPT

## 2023-06-26 PROCEDURE — 6360000002 HC RX W HCPCS: Performed by: EMERGENCY MEDICINE

## 2023-06-26 PROCEDURE — 2580000003 HC RX 258: Performed by: FAMILY MEDICINE

## 2023-06-26 PROCEDURE — 83880 ASSAY OF NATRIURETIC PEPTIDE: CPT

## 2023-06-26 PROCEDURE — 85025 COMPLETE CBC W/AUTO DIFF WBC: CPT

## 2023-06-26 PROCEDURE — 85610 PROTHROMBIN TIME: CPT

## 2023-06-26 PROCEDURE — 93005 ELECTROCARDIOGRAM TRACING: CPT | Performed by: EMERGENCY MEDICINE

## 2023-06-26 PROCEDURE — 99285 EMERGENCY DEPT VISIT HI MDM: CPT

## 2023-06-26 PROCEDURE — 2500000003 HC RX 250 WO HCPCS: Performed by: EMERGENCY MEDICINE

## 2023-06-26 PROCEDURE — 85730 THROMBOPLASTIN TIME PARTIAL: CPT

## 2023-06-26 PROCEDURE — 96365 THER/PROPH/DIAG IV INF INIT: CPT

## 2023-06-26 PROCEDURE — 87040 BLOOD CULTURE FOR BACTERIA: CPT

## 2023-06-26 PROCEDURE — 2580000003 HC RX 258: Performed by: EMERGENCY MEDICINE

## 2023-06-26 PROCEDURE — 36415 COLL VENOUS BLD VENIPUNCTURE: CPT

## 2023-06-26 PROCEDURE — 93010 ELECTROCARDIOGRAM REPORT: CPT | Performed by: INTERNAL MEDICINE

## 2023-06-26 PROCEDURE — 71250 CT THORAX DX C-: CPT

## 2023-06-26 PROCEDURE — 80053 COMPREHEN METABOLIC PANEL: CPT

## 2023-06-26 RX ORDER — ONDANSETRON 2 MG/ML
4 INJECTION INTRAMUSCULAR; INTRAVENOUS EVERY 6 HOURS PRN
Status: DISCONTINUED | OUTPATIENT
Start: 2023-06-26 | End: 2023-06-30 | Stop reason: HOSPADM

## 2023-06-26 RX ORDER — ACETAMINOPHEN 650 MG/1
650 SUPPOSITORY RECTAL EVERY 6 HOURS PRN
Status: DISCONTINUED | OUTPATIENT
Start: 2023-06-26 | End: 2023-06-30 | Stop reason: HOSPADM

## 2023-06-26 RX ORDER — IPRATROPIUM BROMIDE AND ALBUTEROL SULFATE 2.5; .5 MG/3ML; MG/3ML
1 SOLUTION RESPIRATORY (INHALATION)
Status: DISCONTINUED | OUTPATIENT
Start: 2023-06-27 | End: 2023-06-30 | Stop reason: HOSPADM

## 2023-06-26 RX ORDER — AMLODIPINE BESYLATE 5 MG/1
5 TABLET ORAL DAILY
Status: DISCONTINUED | OUTPATIENT
Start: 2023-06-27 | End: 2023-06-30 | Stop reason: HOSPADM

## 2023-06-26 RX ORDER — ONDANSETRON 4 MG/1
4 TABLET, ORALLY DISINTEGRATING ORAL EVERY 8 HOURS PRN
Status: DISCONTINUED | OUTPATIENT
Start: 2023-06-26 | End: 2023-06-30 | Stop reason: HOSPADM

## 2023-06-26 RX ORDER — ATORVASTATIN CALCIUM 80 MG/1
80 TABLET, FILM COATED ORAL DAILY
Status: DISCONTINUED | OUTPATIENT
Start: 2023-06-27 | End: 2023-06-30 | Stop reason: HOSPADM

## 2023-06-26 RX ORDER — DOXYCYCLINE HYCLATE 100 MG/1
100 CAPSULE ORAL EVERY 12 HOURS SCHEDULED
Status: DISCONTINUED | OUTPATIENT
Start: 2023-06-26 | End: 2023-06-27

## 2023-06-26 RX ORDER — ASPIRIN 81 MG/1
81 TABLET, CHEWABLE ORAL DAILY
Status: DISCONTINUED | OUTPATIENT
Start: 2023-06-27 | End: 2023-06-30 | Stop reason: HOSPADM

## 2023-06-26 RX ORDER — DONEPEZIL HYDROCHLORIDE 5 MG/1
10 TABLET, FILM COATED ORAL NIGHTLY
Status: DISCONTINUED | OUTPATIENT
Start: 2023-06-26 | End: 2023-06-30 | Stop reason: HOSPADM

## 2023-06-26 RX ORDER — SODIUM CHLORIDE 0.9 % (FLUSH) 0.9 %
5-40 SYRINGE (ML) INJECTION EVERY 12 HOURS SCHEDULED
Status: DISCONTINUED | OUTPATIENT
Start: 2023-06-26 | End: 2023-06-30 | Stop reason: HOSPADM

## 2023-06-26 RX ORDER — SODIUM CHLORIDE 0.9 % (FLUSH) 0.9 %
10 SYRINGE (ML) INJECTION PRN
Status: DISCONTINUED | OUTPATIENT
Start: 2023-06-26 | End: 2023-06-30 | Stop reason: HOSPADM

## 2023-06-26 RX ORDER — MECLIZINE HCL 12.5 MG/1
12.5 TABLET ORAL 3 TIMES DAILY PRN
Status: DISCONTINUED | OUTPATIENT
Start: 2023-06-26 | End: 2023-06-30 | Stop reason: HOSPADM

## 2023-06-26 RX ORDER — SODIUM CHLORIDE 9 MG/ML
INJECTION, SOLUTION INTRAVENOUS PRN
Status: DISCONTINUED | OUTPATIENT
Start: 2023-06-26 | End: 2023-06-30 | Stop reason: HOSPADM

## 2023-06-26 RX ORDER — ACETAMINOPHEN 325 MG/1
650 TABLET ORAL EVERY 6 HOURS PRN
Status: DISCONTINUED | OUTPATIENT
Start: 2023-06-26 | End: 2023-06-30 | Stop reason: HOSPADM

## 2023-06-26 RX ORDER — ISOSORBIDE MONONITRATE 30 MG/1
30 TABLET, EXTENDED RELEASE ORAL DAILY
Status: DISCONTINUED | OUTPATIENT
Start: 2023-06-27 | End: 2023-06-30 | Stop reason: HOSPADM

## 2023-06-26 RX ADMIN — WATER 1000 MG: 1 INJECTION INTRAMUSCULAR; INTRAVENOUS; SUBCUTANEOUS at 14:10

## 2023-06-26 RX ADMIN — DONEPEZIL HYDROCHLORIDE 10 MG: 5 TABLET, FILM COATED ORAL at 22:00

## 2023-06-26 RX ADMIN — SODIUM CHLORIDE, PRESERVATIVE FREE 10 ML: 5 INJECTION INTRAVENOUS at 21:00

## 2023-06-26 RX ADMIN — DOXYCYCLINE HYCLATE 100 MG: 100 CAPSULE ORAL at 23:00

## 2023-06-26 RX ADMIN — DOXYCYCLINE 100 MG: 100 INJECTION, POWDER, LYOPHILIZED, FOR SOLUTION INTRAVENOUS at 14:13

## 2023-06-26 RX ADMIN — APIXABAN 2.5 MG: 2.5 TABLET, FILM COATED ORAL at 22:00

## 2023-06-26 ASSESSMENT — PAIN - FUNCTIONAL ASSESSMENT
PAIN_FUNCTIONAL_ASSESSMENT: NONE - DENIES PAIN

## 2023-06-27 PROBLEM — J90 BILATERAL PLEURAL EFFUSION: Status: ACTIVE | Noted: 2023-06-27

## 2023-06-27 PROBLEM — E43 SEVERE PROTEIN-CALORIE MALNUTRITION (HCC): Chronic | Status: ACTIVE | Noted: 2023-06-27

## 2023-06-27 LAB
ALBUMIN SERPL-MCNC: 3.9 G/DL (ref 3.5–5.2)
ALP SERPL-CCNC: 43 U/L (ref 35–104)
ALT SERPL-CCNC: 25 U/L (ref 0–32)
ANION GAP SERPL CALCULATED.3IONS-SCNC: 13 MMOL/L (ref 7–16)
AST SERPL-CCNC: 31 U/L (ref 0–31)
BILIRUB SERPL-MCNC: 2.2 MG/DL (ref 0–1.2)
BUN SERPL-MCNC: 37 MG/DL (ref 6–23)
CALCIUM SERPL-MCNC: 9.9 MG/DL (ref 8.6–10.2)
CHLORIDE SERPL-SCNC: 104 MMOL/L (ref 98–107)
CO2 SERPL-SCNC: 26 MMOL/L (ref 22–29)
CREAT SERPL-MCNC: 1.3 MG/DL (ref 0.5–1)
GLUCOSE SERPL-MCNC: 97 MG/DL (ref 74–99)
LEGIONELLA AG UR QL: NORMAL
POTASSIUM SERPL-SCNC: 3.8 MMOL/L (ref 3.5–5)
PROCALCITONIN: 0.07 NG/ML (ref 0–0.08)
PROT SERPL-MCNC: 6.8 G/DL (ref 6.4–8.3)
S PNEUM AG SPEC QL: NORMAL
SODIUM SERPL-SCNC: 143 MMOL/L (ref 132–146)

## 2023-06-27 PROCEDURE — 2580000003 HC RX 258: Performed by: FAMILY MEDICINE

## 2023-06-27 PROCEDURE — 94664 DEMO&/EVAL PT USE INHALER: CPT

## 2023-06-27 PROCEDURE — 80053 COMPREHEN METABOLIC PANEL: CPT

## 2023-06-27 PROCEDURE — 97535 SELF CARE MNGMENT TRAINING: CPT

## 2023-06-27 PROCEDURE — 97165 OT EVAL LOW COMPLEX 30 MIN: CPT

## 2023-06-27 PROCEDURE — 36415 COLL VENOUS BLD VENIPUNCTURE: CPT

## 2023-06-27 PROCEDURE — 2060000000 HC ICU INTERMEDIATE R&B

## 2023-06-27 PROCEDURE — 87449 NOS EACH ORGANISM AG IA: CPT

## 2023-06-27 PROCEDURE — 94640 AIRWAY INHALATION TREATMENT: CPT

## 2023-06-27 PROCEDURE — 6370000000 HC RX 637 (ALT 250 FOR IP): Performed by: FAMILY MEDICINE

## 2023-06-27 PROCEDURE — 84145 PROCALCITONIN (PCT): CPT

## 2023-06-27 PROCEDURE — 2500000003 HC RX 250 WO HCPCS: Performed by: NURSE PRACTITIONER

## 2023-06-27 RX ORDER — BUMETANIDE 0.25 MG/ML
0.5 INJECTION INTRAMUSCULAR; INTRAVENOUS 2 TIMES DAILY
Status: DISCONTINUED | OUTPATIENT
Start: 2023-06-27 | End: 2023-06-30 | Stop reason: HOSPADM

## 2023-06-27 RX ADMIN — IPRATROPIUM BROMIDE AND ALBUTEROL SULFATE 1 DOSE: .5; 2.5 SOLUTION RESPIRATORY (INHALATION) at 07:55

## 2023-06-27 RX ADMIN — AMLODIPINE BESYLATE 5 MG: 5 TABLET ORAL at 09:55

## 2023-06-27 RX ADMIN — BUMETANIDE 0.5 MG: 0.25 INJECTION INTRAMUSCULAR; INTRAVENOUS at 12:50

## 2023-06-27 RX ADMIN — APIXABAN 2.5 MG: 2.5 TABLET, FILM COATED ORAL at 09:54

## 2023-06-27 RX ADMIN — DOXYCYCLINE HYCLATE 100 MG: 100 CAPSULE ORAL at 09:54

## 2023-06-27 RX ADMIN — IPRATROPIUM BROMIDE AND ALBUTEROL SULFATE 1 DOSE: .5; 2.5 SOLUTION RESPIRATORY (INHALATION) at 13:05

## 2023-06-27 RX ADMIN — BUMETANIDE 0.5 MG: 0.25 INJECTION INTRAMUSCULAR; INTRAVENOUS at 20:47

## 2023-06-27 RX ADMIN — ISOSORBIDE MONONITRATE 30 MG: 30 TABLET, EXTENDED RELEASE ORAL at 09:54

## 2023-06-27 RX ADMIN — IPRATROPIUM BROMIDE AND ALBUTEROL SULFATE 1 DOSE: .5; 2.5 SOLUTION RESPIRATORY (INHALATION) at 19:51

## 2023-06-27 RX ADMIN — SODIUM CHLORIDE, PRESERVATIVE FREE 10 ML: 5 INJECTION INTRAVENOUS at 20:47

## 2023-06-27 RX ADMIN — APIXABAN 2.5 MG: 2.5 TABLET, FILM COATED ORAL at 20:47

## 2023-06-27 RX ADMIN — SODIUM CHLORIDE, PRESERVATIVE FREE 10 ML: 5 INJECTION INTRAVENOUS at 09:55

## 2023-06-27 RX ADMIN — DONEPEZIL HYDROCHLORIDE 10 MG: 5 TABLET, FILM COATED ORAL at 20:47

## 2023-06-27 RX ADMIN — ATORVASTATIN CALCIUM 80 MG: 80 TABLET, FILM COATED ORAL at 09:54

## 2023-06-27 RX ADMIN — ASPIRIN 81 MG: 81 TABLET, CHEWABLE ORAL at 09:54

## 2023-06-28 LAB
ALBUMIN SERPL-MCNC: 3.4 G/DL (ref 3.5–5.2)
ALP SERPL-CCNC: 35 U/L (ref 35–104)
ALT SERPL-CCNC: 18 U/L (ref 0–32)
ANION GAP SERPL CALCULATED.3IONS-SCNC: 12 MMOL/L (ref 7–16)
AST SERPL-CCNC: 22 U/L (ref 0–31)
BILIRUB SERPL-MCNC: 2 MG/DL (ref 0–1.2)
BUN SERPL-MCNC: 38 MG/DL (ref 6–23)
CALCIUM SERPL-MCNC: 9.4 MG/DL (ref 8.6–10.2)
CHLORIDE SERPL-SCNC: 104 MMOL/L (ref 98–107)
CO2 SERPL-SCNC: 26 MMOL/L (ref 22–29)
CREAT SERPL-MCNC: 1.5 MG/DL (ref 0.5–1)
GLUCOSE SERPL-MCNC: 96 MG/DL (ref 74–99)
POTASSIUM SERPL-SCNC: 4 MMOL/L (ref 3.5–5)
PROT SERPL-MCNC: 5.8 G/DL (ref 6.4–8.3)
SODIUM SERPL-SCNC: 142 MMOL/L (ref 132–146)

## 2023-06-28 PROCEDURE — 6370000000 HC RX 637 (ALT 250 FOR IP): Performed by: FAMILY MEDICINE

## 2023-06-28 PROCEDURE — 97161 PT EVAL LOW COMPLEX 20 MIN: CPT

## 2023-06-28 PROCEDURE — 2060000000 HC ICU INTERMEDIATE R&B

## 2023-06-28 PROCEDURE — 94640 AIRWAY INHALATION TREATMENT: CPT

## 2023-06-28 PROCEDURE — 36415 COLL VENOUS BLD VENIPUNCTURE: CPT

## 2023-06-28 PROCEDURE — 80053 COMPREHEN METABOLIC PANEL: CPT

## 2023-06-28 PROCEDURE — 2580000003 HC RX 258: Performed by: FAMILY MEDICINE

## 2023-06-28 PROCEDURE — 2500000003 HC RX 250 WO HCPCS: Performed by: NURSE PRACTITIONER

## 2023-06-28 PROCEDURE — 97530 THERAPEUTIC ACTIVITIES: CPT

## 2023-06-28 RX ADMIN — SODIUM CHLORIDE, PRESERVATIVE FREE 10 ML: 5 INJECTION INTRAVENOUS at 20:27

## 2023-06-28 RX ADMIN — BUMETANIDE 0.5 MG: 0.25 INJECTION INTRAMUSCULAR; INTRAVENOUS at 08:43

## 2023-06-28 RX ADMIN — ISOSORBIDE MONONITRATE 30 MG: 30 TABLET, EXTENDED RELEASE ORAL at 08:35

## 2023-06-28 RX ADMIN — BUMETANIDE 0.5 MG: 0.25 INJECTION INTRAMUSCULAR; INTRAVENOUS at 20:31

## 2023-06-28 RX ADMIN — DONEPEZIL HYDROCHLORIDE 10 MG: 5 TABLET, FILM COATED ORAL at 20:31

## 2023-06-28 RX ADMIN — ATORVASTATIN CALCIUM 80 MG: 80 TABLET, FILM COATED ORAL at 08:35

## 2023-06-28 RX ADMIN — AMLODIPINE BESYLATE 5 MG: 5 TABLET ORAL at 08:35

## 2023-06-28 RX ADMIN — IPRATROPIUM BROMIDE AND ALBUTEROL SULFATE 1 DOSE: .5; 2.5 SOLUTION RESPIRATORY (INHALATION) at 16:38

## 2023-06-28 RX ADMIN — IPRATROPIUM BROMIDE AND ALBUTEROL SULFATE 1 DOSE: .5; 2.5 SOLUTION RESPIRATORY (INHALATION) at 08:46

## 2023-06-28 RX ADMIN — SODIUM CHLORIDE, PRESERVATIVE FREE 10 ML: 5 INJECTION INTRAVENOUS at 08:44

## 2023-06-28 RX ADMIN — ASPIRIN 81 MG: 81 TABLET, CHEWABLE ORAL at 08:35

## 2023-06-28 RX ADMIN — APIXABAN 2.5 MG: 2.5 TABLET, FILM COATED ORAL at 08:35

## 2023-06-29 ENCOUNTER — APPOINTMENT (OUTPATIENT)
Dept: GENERAL RADIOLOGY | Age: 88
End: 2023-06-29
Payer: MEDICARE

## 2023-06-29 LAB
ALBUMIN SERPL-MCNC: 3.5 G/DL (ref 3.5–5.2)
ALP SERPL-CCNC: 33 U/L (ref 35–104)
ALT SERPL-CCNC: 16 U/L (ref 0–32)
ANION GAP SERPL CALCULATED.3IONS-SCNC: 9 MMOL/L (ref 7–16)
AST SERPL-CCNC: 19 U/L (ref 0–31)
BILIRUB SERPL-MCNC: 1.8 MG/DL (ref 0–1.2)
BUN SERPL-MCNC: 42 MG/DL (ref 6–23)
CALCIUM SERPL-MCNC: 9.2 MG/DL (ref 8.6–10.2)
CHLORIDE SERPL-SCNC: 102 MMOL/L (ref 98–107)
CO2 SERPL-SCNC: 28 MMOL/L (ref 22–29)
CREAT SERPL-MCNC: 1.5 MG/DL (ref 0.5–1)
GLUCOSE SERPL-MCNC: 97 MG/DL (ref 74–99)
POTASSIUM SERPL-SCNC: 3.9 MMOL/L (ref 3.5–5)
PROT SERPL-MCNC: 5.6 G/DL (ref 6.4–8.3)
SODIUM SERPL-SCNC: 139 MMOL/L (ref 132–146)

## 2023-06-29 PROCEDURE — 71046 X-RAY EXAM CHEST 2 VIEWS: CPT

## 2023-06-29 PROCEDURE — 80053 COMPREHEN METABOLIC PANEL: CPT

## 2023-06-29 PROCEDURE — 2580000003 HC RX 258: Performed by: FAMILY MEDICINE

## 2023-06-29 PROCEDURE — 2060000000 HC ICU INTERMEDIATE R&B

## 2023-06-29 PROCEDURE — 6370000000 HC RX 637 (ALT 250 FOR IP): Performed by: FAMILY MEDICINE

## 2023-06-29 PROCEDURE — 2500000003 HC RX 250 WO HCPCS: Performed by: NURSE PRACTITIONER

## 2023-06-29 PROCEDURE — 94640 AIRWAY INHALATION TREATMENT: CPT

## 2023-06-29 PROCEDURE — 36415 COLL VENOUS BLD VENIPUNCTURE: CPT

## 2023-06-29 RX ORDER — IPRATROPIUM BROMIDE AND ALBUTEROL SULFATE 2.5; .5 MG/3ML; MG/3ML
3 SOLUTION RESPIRATORY (INHALATION)
Qty: 360 ML | Refills: 0 | Status: SHIPPED | OUTPATIENT
Start: 2023-06-29

## 2023-06-29 RX ADMIN — IPRATROPIUM BROMIDE AND ALBUTEROL SULFATE 1 DOSE: .5; 2.5 SOLUTION RESPIRATORY (INHALATION) at 19:59

## 2023-06-29 RX ADMIN — IPRATROPIUM BROMIDE AND ALBUTEROL SULFATE 1 DOSE: .5; 2.5 SOLUTION RESPIRATORY (INHALATION) at 11:30

## 2023-06-29 RX ADMIN — ASPIRIN 81 MG: 81 TABLET, CHEWABLE ORAL at 08:57

## 2023-06-29 RX ADMIN — SODIUM CHLORIDE, PRESERVATIVE FREE 10 ML: 5 INJECTION INTRAVENOUS at 21:50

## 2023-06-29 RX ADMIN — BUMETANIDE 0.5 MG: 0.25 INJECTION INTRAMUSCULAR; INTRAVENOUS at 21:47

## 2023-06-29 RX ADMIN — AMLODIPINE BESYLATE 5 MG: 5 TABLET ORAL at 08:55

## 2023-06-29 RX ADMIN — ISOSORBIDE MONONITRATE 30 MG: 30 TABLET, EXTENDED RELEASE ORAL at 08:55

## 2023-06-29 RX ADMIN — DONEPEZIL HYDROCHLORIDE 10 MG: 5 TABLET, FILM COATED ORAL at 21:47

## 2023-06-29 RX ADMIN — IPRATROPIUM BROMIDE AND ALBUTEROL SULFATE 1 DOSE: .5; 2.5 SOLUTION RESPIRATORY (INHALATION) at 07:54

## 2023-06-29 RX ADMIN — SODIUM CHLORIDE, PRESERVATIVE FREE 10 ML: 5 INJECTION INTRAVENOUS at 08:56

## 2023-06-29 RX ADMIN — BUMETANIDE 0.5 MG: 0.25 INJECTION INTRAMUSCULAR; INTRAVENOUS at 08:55

## 2023-06-29 RX ADMIN — ATORVASTATIN CALCIUM 80 MG: 80 TABLET, FILM COATED ORAL at 08:55

## 2023-06-30 VITALS
SYSTOLIC BLOOD PRESSURE: 122 MMHG | HEART RATE: 68 BPM | BODY MASS INDEX: 16.35 KG/M2 | TEMPERATURE: 97.8 F | OXYGEN SATURATION: 100 % | DIASTOLIC BLOOD PRESSURE: 76 MMHG | WEIGHT: 81.1 LBS | RESPIRATION RATE: 20 BRPM | HEIGHT: 59 IN

## 2023-06-30 LAB
ALBUMIN SERPL-MCNC: 3.5 G/DL (ref 3.5–5.2)
ALP SERPL-CCNC: 36 U/L (ref 35–104)
ALT SERPL-CCNC: 16 U/L (ref 0–32)
ANION GAP SERPL CALCULATED.3IONS-SCNC: 14 MMOL/L (ref 7–16)
AST SERPL-CCNC: 20 U/L (ref 0–31)
BILIRUB SERPL-MCNC: 2 MG/DL (ref 0–1.2)
BUN SERPL-MCNC: 44 MG/DL (ref 6–23)
CALCIUM SERPL-MCNC: 9.6 MG/DL (ref 8.6–10.2)
CHLORIDE SERPL-SCNC: 103 MMOL/L (ref 98–107)
CO2 SERPL-SCNC: 27 MMOL/L (ref 22–29)
CREAT SERPL-MCNC: 1.5 MG/DL (ref 0.5–1)
GLUCOSE SERPL-MCNC: 94 MG/DL (ref 74–99)
POTASSIUM SERPL-SCNC: 3.8 MMOL/L (ref 3.5–5)
PROT SERPL-MCNC: 6.2 G/DL (ref 6.4–8.3)
SODIUM SERPL-SCNC: 144 MMOL/L (ref 132–146)

## 2023-06-30 PROCEDURE — 6370000000 HC RX 637 (ALT 250 FOR IP): Performed by: FAMILY MEDICINE

## 2023-06-30 PROCEDURE — 94640 AIRWAY INHALATION TREATMENT: CPT

## 2023-06-30 PROCEDURE — 80053 COMPREHEN METABOLIC PANEL: CPT

## 2023-06-30 PROCEDURE — 36415 COLL VENOUS BLD VENIPUNCTURE: CPT

## 2023-06-30 RX ORDER — BUMETANIDE 0.5 MG/1
0.5 TABLET ORAL DAILY
Qty: 30 TABLET | Refills: 0 | Status: SHIPPED | OUTPATIENT
Start: 2023-06-30

## 2023-06-30 RX ADMIN — ATORVASTATIN CALCIUM 80 MG: 80 TABLET, FILM COATED ORAL at 09:47

## 2023-06-30 RX ADMIN — ASPIRIN 81 MG: 81 TABLET, CHEWABLE ORAL at 09:47

## 2023-06-30 RX ADMIN — ISOSORBIDE MONONITRATE 30 MG: 30 TABLET, EXTENDED RELEASE ORAL at 09:47

## 2023-06-30 RX ADMIN — AMLODIPINE BESYLATE 5 MG: 5 TABLET ORAL at 09:47

## 2023-06-30 RX ADMIN — IPRATROPIUM BROMIDE AND ALBUTEROL SULFATE 1 DOSE: .5; 2.5 SOLUTION RESPIRATORY (INHALATION) at 06:06

## 2023-07-01 LAB
BACTERIA BLD CULT ORG #2: NORMAL
BACTERIA BLD CULT: NORMAL

## 2023-07-06 NOTE — PROGRESS NOTES
Physician Progress Note      Sandra Howe  [facility] #:                  [id number]  :                       1927  ADMIT DATE:       2023 12:05 PM  [address] [place] [address] DATE:        2023 11:59 AM  RESPONDING  PROVIDER #:        Mauri Plummer MD          QUERY TEXT:    Patient admitted with Bilateral pleural effusion. Documentation reflects   \"Bilateral infiltrates, edema vs pneumonia\" per pulmonary and \"Right upper   lobe and left upper lobe pneumonia\" per  CXR. Pt treated with Rocephin and   Doxycycline. If possible, please document in the progress notes and discharge   summary if pneumonia was: The medical record reflects the following:  Risk Factors: CHF, HTN  Clinical Indicators: Per pulmonary notes: Bilateral infiltrates, edema vs   pneumonia. CXR: Right upper lobe and left upper lobe airspace disease is noted   favored to represent pneumonia. Treatment: Rocephin, Doxycycline,    Thank you,  WILLIAM [email or web address] [email or web address]  Options provided:  -- Pneumonia confirmed after study  -- Pneumonia ruled out after study  -- Other - I will add my own diagnosis  -- Disagree - Not applicable / Not valid  -- Disagree - Clinically unable to determine / Unknown  -- Refer to Clinical Documentation Reviewer    PROVIDER RESPONSE TEXT:    Pneumonia confirmed after study.     Query created by: Bridgette Howell on 2023 8:00 AM      Electronically signed by:  Mauri Plummer MD 2023 11:40 AM

## 2023-08-01 ENCOUNTER — HOSPITAL ENCOUNTER (EMERGENCY)
Age: 88
Discharge: ANOTHER HOSPITAL | End: 2023-08-01
Attending: STUDENT IN AN ORGANIZED HEALTH CARE EDUCATION/TRAINING PROGRAM
Payer: MEDICARE

## 2023-08-01 ENCOUNTER — HOSPITAL ENCOUNTER (INPATIENT)
Age: 88
LOS: 6 days | Discharge: HOME HEALTH | DRG: 291 | End: 2023-08-07
Attending: INTERNAL MEDICINE | Admitting: INTERNAL MEDICINE
Payer: MEDICARE

## 2023-08-01 ENCOUNTER — APPOINTMENT (OUTPATIENT)
Dept: GENERAL RADIOLOGY | Age: 88
End: 2023-08-01
Payer: MEDICARE

## 2023-08-01 ENCOUNTER — APPOINTMENT (OUTPATIENT)
Dept: CT IMAGING | Age: 88
End: 2023-08-01
Payer: MEDICARE

## 2023-08-01 VITALS
DIASTOLIC BLOOD PRESSURE: 53 MMHG | TEMPERATURE: 98.1 F | OXYGEN SATURATION: 96 % | HEIGHT: 57 IN | SYSTOLIC BLOOD PRESSURE: 167 MMHG | WEIGHT: 83.5 LBS | RESPIRATION RATE: 20 BRPM | BODY MASS INDEX: 18.01 KG/M2 | HEART RATE: 61 BPM

## 2023-08-01 DIAGNOSIS — D64.9 ACUTE ANEMIA: ICD-10-CM

## 2023-08-01 DIAGNOSIS — I50.9 ACUTE CONGESTIVE HEART FAILURE, UNSPECIFIED HEART FAILURE TYPE (HCC): ICD-10-CM

## 2023-08-01 DIAGNOSIS — J96.01 ACUTE RESPIRATORY FAILURE WITH HYPOXIA (HCC): Primary | ICD-10-CM

## 2023-08-01 PROBLEM — I35.0 SEVERE AORTIC STENOSIS: Status: ACTIVE | Noted: 2023-08-01

## 2023-08-01 PROBLEM — I50.31 ACUTE DIASTOLIC CHF (CONGESTIVE HEART FAILURE) (HCC): Status: ACTIVE | Noted: 2023-08-01

## 2023-08-01 PROBLEM — K80.50 COMMON BILE DUCT CALCULI: Status: ACTIVE | Noted: 2023-08-01

## 2023-08-01 PROBLEM — I50.33 ACUTE ON CHRONIC HEART FAILURE WITH PRESERVED EJECTION FRACTION (HCC): Status: ACTIVE | Noted: 2023-08-01

## 2023-08-01 LAB
ALBUMIN SERPL-MCNC: 3.9 G/DL (ref 3.5–5.2)
ALP SERPL-CCNC: 35 U/L (ref 35–104)
ALT SERPL-CCNC: 14 U/L (ref 0–32)
ANION GAP SERPL CALCULATED.3IONS-SCNC: 13 MMOL/L (ref 7–16)
ANION GAP SERPL CALCULATED.3IONS-SCNC: 14 MMOL/L (ref 7–16)
AST SERPL-CCNC: 22 U/L (ref 0–31)
BACTERIA URNS QL MICRO: ABNORMAL
BASOPHILS # BLD: 0.03 K/UL (ref 0–0.2)
BASOPHILS NFR BLD: 1 % (ref 0–2)
BILIRUB DIRECT SERPL-MCNC: 0.4 MG/DL (ref 0–0.3)
BILIRUB INDIRECT SERPL-MCNC: 2.1 MG/DL (ref 0–1)
BILIRUB SERPL-MCNC: 2.5 MG/DL (ref 0–1.2)
BILIRUB UR QL STRIP: NEGATIVE
BNP SERPL-MCNC: ABNORMAL PG/ML (ref 0–450)
BUN SERPL-MCNC: 53 MG/DL (ref 6–23)
BUN SERPL-MCNC: 53 MG/DL (ref 6–23)
CALCIUM SERPL-MCNC: 9.8 MG/DL (ref 8.6–10.2)
CALCIUM SERPL-MCNC: 9.8 MG/DL (ref 8.6–10.2)
CHLORIDE SERPL-SCNC: 103 MMOL/L (ref 98–107)
CHLORIDE SERPL-SCNC: 105 MMOL/L (ref 98–107)
CK SERPL-CCNC: 48 U/L (ref 20–180)
CLARITY UR: CLEAR
CO2 SERPL-SCNC: 24 MMOL/L (ref 22–29)
CO2 SERPL-SCNC: 25 MMOL/L (ref 22–29)
COLOR UR: YELLOW
CREAT SERPL-MCNC: 1.4 MG/DL (ref 0.5–1)
CREAT SERPL-MCNC: 1.6 MG/DL (ref 0.5–1)
EKG ATRIAL RATE: 62 BPM
EKG Q-T INTERVAL: 352 MS
EKG QRS DURATION: 96 MS
EKG QTC CALCULATION (BAZETT): 366 MS
EKG R AXIS: 46 DEGREES
EKG T AXIS: -122 DEGREES
EKG VENTRICULAR RATE: 65 BPM
EOSINOPHIL # BLD: 0.03 K/UL (ref 0.05–0.5)
EOSINOPHILS RELATIVE PERCENT: 1 % (ref 0–6)
ERYTHROCYTE [DISTWIDTH] IN BLOOD BY AUTOMATED COUNT: 13.5 % (ref 11.5–15)
FLUAV RNA RESP QL NAA+PROBE: NOT DETECTED
FLUBV RNA RESP QL NAA+PROBE: NOT DETECTED
GFR SERPL CREATININE-BSD FRML MDRD: 30 ML/MIN/1.73M2
GFR SERPL CREATININE-BSD FRML MDRD: 34 ML/MIN/1.73M2
GLUCOSE SERPL-MCNC: 100 MG/DL (ref 74–99)
GLUCOSE SERPL-MCNC: 129 MG/DL (ref 74–99)
GLUCOSE UR STRIP-MCNC: NEGATIVE MG/DL
HCT VFR BLD AUTO: 24.6 % (ref 34–48)
HCT VFR BLD AUTO: 26.8 % (ref 34–48)
HGB BLD-MCNC: 7.7 G/DL (ref 11.5–15.5)
HGB BLD-MCNC: 8.2 G/DL (ref 11.5–15.5)
HGB UR QL STRIP.AUTO: NEGATIVE
IMM GRANULOCYTES # BLD AUTO: <0.03 K/UL (ref 0–0.58)
IMM GRANULOCYTES NFR BLD: 0 % (ref 0–5)
INR PPP: 2
KETONES UR STRIP-MCNC: NEGATIVE MG/DL
LACTATE BLDV-SCNC: 0.6 MMOL/L (ref 0.5–1.9)
LACTATE BLDV-SCNC: 1.5 MMOL/L (ref 0.5–1.9)
LEUKOCYTE ESTERASE UR QL STRIP: NEGATIVE
LIPASE SERPL-CCNC: 52 U/L (ref 13–60)
LYMPHOCYTES NFR BLD: 0.55 K/UL (ref 1.5–4)
LYMPHOCYTES RELATIVE PERCENT: 10 % (ref 20–42)
MCH RBC QN AUTO: 28.1 PG (ref 26–35)
MCHC RBC AUTO-ENTMCNC: 31.3 G/DL (ref 32–34.5)
MCV RBC AUTO: 89.8 FL (ref 80–99.9)
MONOCYTES NFR BLD: 0.3 K/UL (ref 0.1–0.95)
MONOCYTES NFR BLD: 6 % (ref 2–12)
NEUTROPHILS NFR BLD: 83 % (ref 43–80)
NEUTS SEG NFR BLD: 4.4 K/UL (ref 1.8–7.3)
NITRITE UR QL STRIP: NEGATIVE
PARTIAL THROMBOPLASTIN TIME: 31.1 SEC (ref 24.5–35.1)
PH UR STRIP: 5 [PH] (ref 5–9)
PLATELET # BLD AUTO: 245 K/UL (ref 130–450)
PMV BLD AUTO: 10.8 FL (ref 7–12)
POTASSIUM SERPL-SCNC: 3.9 MMOL/L (ref 3.5–5)
POTASSIUM SERPL-SCNC: 4.1 MMOL/L (ref 3.5–5)
PROT SERPL-MCNC: 6.6 G/DL (ref 6.4–8.3)
PROT UR STRIP-MCNC: NEGATIVE MG/DL
PROTHROMBIN TIME: 22.7 SEC (ref 9.3–12.4)
RBC # BLD AUTO: 2.74 M/UL (ref 3.5–5.5)
RBC # BLD: ABNORMAL 10*6/UL
RBC #/AREA URNS HPF: ABNORMAL /HPF
SARS-COV-2 RNA RESP QL NAA+PROBE: NOT DETECTED
SODIUM SERPL-SCNC: 141 MMOL/L (ref 132–146)
SODIUM SERPL-SCNC: 143 MMOL/L (ref 132–146)
SOURCE: NORMAL
SP GR UR STRIP: 1.02 (ref 1–1.03)
SPECIMEN DESCRIPTION: NORMAL
TROPONIN I SERPL HS-MCNC: 38 NG/L (ref 0–9)
TROPONIN I SERPL HS-MCNC: 41 NG/L (ref 0–9)
UROBILINOGEN UR STRIP-ACNC: 0.2 EU/DL (ref 0–1)
WBC #/AREA URNS HPF: ABNORMAL /HPF
WBC OTHER # BLD: 5.3 K/UL (ref 4.5–11.5)

## 2023-08-01 PROCEDURE — 87040 BLOOD CULTURE FOR BACTERIA: CPT

## 2023-08-01 PROCEDURE — 2500000003 HC RX 250 WO HCPCS: Performed by: INTERNAL MEDICINE

## 2023-08-01 PROCEDURE — 85014 HEMATOCRIT: CPT

## 2023-08-01 PROCEDURE — 87086 URINE CULTURE/COLONY COUNT: CPT

## 2023-08-01 PROCEDURE — 85025 COMPLETE CBC W/AUTO DIFF WBC: CPT

## 2023-08-01 PROCEDURE — 71250 CT THORAX DX C-: CPT

## 2023-08-01 PROCEDURE — 6360000002 HC RX W HCPCS: Performed by: EMERGENCY MEDICINE

## 2023-08-01 PROCEDURE — 96374 THER/PROPH/DIAG INJ IV PUSH: CPT

## 2023-08-01 PROCEDURE — 80053 COMPREHEN METABOLIC PANEL: CPT

## 2023-08-01 PROCEDURE — 6370000000 HC RX 637 (ALT 250 FOR IP): Performed by: INTERNAL MEDICINE

## 2023-08-01 PROCEDURE — 84484 ASSAY OF TROPONIN QUANT: CPT

## 2023-08-01 PROCEDURE — 85018 HEMOGLOBIN: CPT

## 2023-08-01 PROCEDURE — 93005 ELECTROCARDIOGRAM TRACING: CPT | Performed by: STUDENT IN AN ORGANIZED HEALTH CARE EDUCATION/TRAINING PROGRAM

## 2023-08-01 PROCEDURE — 99285 EMERGENCY DEPT VISIT HI MDM: CPT

## 2023-08-01 PROCEDURE — 93010 ELECTROCARDIOGRAM REPORT: CPT | Performed by: INTERNAL MEDICINE

## 2023-08-01 PROCEDURE — 85730 THROMBOPLASTIN TIME PARTIAL: CPT

## 2023-08-01 PROCEDURE — 1200000000 HC SEMI PRIVATE

## 2023-08-01 PROCEDURE — 81001 URINALYSIS AUTO W/SCOPE: CPT

## 2023-08-01 PROCEDURE — 83690 ASSAY OF LIPASE: CPT

## 2023-08-01 PROCEDURE — 83605 ASSAY OF LACTIC ACID: CPT

## 2023-08-01 PROCEDURE — 83880 ASSAY OF NATRIURETIC PEPTIDE: CPT

## 2023-08-01 PROCEDURE — 74176 CT ABD & PELVIS W/O CONTRAST: CPT

## 2023-08-01 PROCEDURE — 85610 PROTHROMBIN TIME: CPT

## 2023-08-01 PROCEDURE — 71045 X-RAY EXAM CHEST 1 VIEW: CPT

## 2023-08-01 PROCEDURE — 82248 BILIRUBIN DIRECT: CPT

## 2023-08-01 PROCEDURE — 87636 SARSCOV2 & INF A&B AMP PRB: CPT

## 2023-08-01 PROCEDURE — 80048 BASIC METABOLIC PNL TOTAL CA: CPT

## 2023-08-01 PROCEDURE — 82550 ASSAY OF CK (CPK): CPT

## 2023-08-01 RX ORDER — NITROGLYCERIN 0.4 MG/1
0.4 TABLET SUBLINGUAL EVERY 5 MIN PRN
Status: DISCONTINUED | OUTPATIENT
Start: 2023-08-01 | End: 2023-08-07 | Stop reason: HOSPADM

## 2023-08-01 RX ORDER — FUROSEMIDE 10 MG/ML
20 INJECTION INTRAMUSCULAR; INTRAVENOUS ONCE
Status: COMPLETED | OUTPATIENT
Start: 2023-08-01 | End: 2023-08-01

## 2023-08-01 RX ORDER — IPRATROPIUM BROMIDE AND ALBUTEROL SULFATE 2.5; .5 MG/3ML; MG/3ML
1 SOLUTION RESPIRATORY (INHALATION) EVERY 4 HOURS PRN
Status: DISCONTINUED | OUTPATIENT
Start: 2023-08-01 | End: 2023-08-07 | Stop reason: HOSPADM

## 2023-08-01 RX ORDER — BUMETANIDE 0.25 MG/ML
0.5 INJECTION INTRAMUSCULAR; INTRAVENOUS ONCE
Status: ON HOLD | COMMUNITY
End: 2023-08-07 | Stop reason: HOSPADM

## 2023-08-01 RX ORDER — AMLODIPINE BESYLATE 5 MG/1
5 TABLET ORAL DAILY
Status: DISCONTINUED | OUTPATIENT
Start: 2023-08-02 | End: 2023-08-07

## 2023-08-01 RX ORDER — ISOSORBIDE MONONITRATE 30 MG/1
30 TABLET, EXTENDED RELEASE ORAL DAILY
Status: DISCONTINUED | OUTPATIENT
Start: 2023-08-02 | End: 2023-08-07 | Stop reason: HOSPADM

## 2023-08-01 RX ORDER — BUMETANIDE 1 MG/1
0.5 TABLET ORAL DAILY
Status: DISCONTINUED | OUTPATIENT
Start: 2023-08-02 | End: 2023-08-07 | Stop reason: HOSPADM

## 2023-08-01 RX ORDER — ATORVASTATIN CALCIUM 40 MG/1
80 TABLET, FILM COATED ORAL DAILY
Status: DISCONTINUED | OUTPATIENT
Start: 2023-08-02 | End: 2023-08-07 | Stop reason: HOSPADM

## 2023-08-01 RX ORDER — ASPIRIN 81 MG/1
81 TABLET ORAL DAILY
Status: DISCONTINUED | OUTPATIENT
Start: 2023-08-02 | End: 2023-08-02

## 2023-08-01 RX ORDER — BUMETANIDE 0.25 MG/ML
0.5 INJECTION INTRAMUSCULAR; INTRAVENOUS ONCE
Status: COMPLETED | OUTPATIENT
Start: 2023-08-01 | End: 2023-08-01

## 2023-08-01 RX ORDER — MECLIZINE HCL 12.5 MG/1
12.5 TABLET ORAL 3 TIMES DAILY PRN
Status: DISCONTINUED | OUTPATIENT
Start: 2023-08-01 | End: 2023-08-07 | Stop reason: HOSPADM

## 2023-08-01 RX ORDER — TROSPIUM CHLORIDE 20 MG/1
20 TABLET, FILM COATED ORAL NIGHTLY
Status: DISCONTINUED | OUTPATIENT
Start: 2023-08-01 | End: 2023-08-07 | Stop reason: HOSPADM

## 2023-08-01 RX ORDER — DONEPEZIL HYDROCHLORIDE 10 MG/1
10 TABLET, FILM COATED ORAL NIGHTLY
Status: DISCONTINUED | OUTPATIENT
Start: 2023-08-01 | End: 2023-08-07 | Stop reason: HOSPADM

## 2023-08-01 RX ADMIN — BUMETANIDE 0.5 MG: 0.25 INJECTION INTRAMUSCULAR; INTRAVENOUS at 21:57

## 2023-08-01 RX ADMIN — FUROSEMIDE 20 MG: 10 INJECTION, SOLUTION INTRAMUSCULAR; INTRAVENOUS at 09:31

## 2023-08-01 RX ADMIN — DONEPEZIL HYDROCHLORIDE 10 MG: 10 TABLET, FILM COATED ORAL at 21:57

## 2023-08-01 RX ADMIN — MECLIZINE 12.5 MG: 12.5 TABLET ORAL at 21:57

## 2023-08-01 SDOH — ECONOMIC STABILITY: HOUSING INSECURITY
IN THE LAST 12 MONTHS, WAS THERE A TIME WHEN YOU DID NOT HAVE A STEADY PLACE TO SLEEP OR SLEPT IN A SHELTER (INCLUDING NOW)?: NO

## 2023-08-01 SDOH — ECONOMIC STABILITY: INCOME INSECURITY: IN THE LAST 12 MONTHS, WAS THERE A TIME WHEN YOU WERE NOT ABLE TO PAY THE MORTGAGE OR RENT ON TIME?: NO

## 2023-08-01 SDOH — ECONOMIC STABILITY: TRANSPORTATION INSECURITY
IN THE PAST 12 MONTHS, HAS THE LACK OF TRANSPORTATION KEPT YOU FROM MEDICAL APPOINTMENTS OR FROM GETTING MEDICATIONS?: NO

## 2023-08-01 SDOH — ECONOMIC STABILITY: HOUSING INSECURITY: IN THE LAST 12 MONTHS, HOW MANY PLACES HAVE YOU LIVED?: 1

## 2023-08-01 SDOH — ECONOMIC STABILITY: TRANSPORTATION INSECURITY
IN THE PAST 12 MONTHS, HAS LACK OF TRANSPORTATION KEPT YOU FROM MEETINGS, WORK, OR FROM GETTING THINGS NEEDED FOR DAILY LIVING?: NO

## 2023-08-01 ASSESSMENT — LIFESTYLE VARIABLES
HOW MANY STANDARD DRINKS CONTAINING ALCOHOL DO YOU HAVE ON A TYPICAL DAY: PATIENT DOES NOT DRINK
HOW OFTEN DO YOU HAVE A DRINK CONTAINING ALCOHOL: NEVER

## 2023-08-01 ASSESSMENT — SOCIAL DETERMINANTS OF HEALTH (SDOH)
WITHIN THE LAST YEAR, HAVE TO BEEN RAPED OR FORCED TO HAVE ANY KIND OF SEXUAL ACTIVITY BY YOUR PARTNER OR EX-PARTNER?: NO
HOW HARD IS IT FOR YOU TO PAY FOR THE VERY BASICS LIKE FOOD, HOUSING, MEDICAL CARE, AND HEATING?: NOT HARD AT ALL
WITHIN THE LAST YEAR, HAVE YOU BEEN KICKED, HIT, SLAPPED, OR OTHERWISE PHYSICALLY HURT BY YOUR PARTNER OR EX-PARTNER?: NO
WITHIN THE LAST YEAR, HAVE YOU BEEN HUMILIATED OR EMOTIONALLY ABUSED IN OTHER WAYS BY YOUR PARTNER OR EX-PARTNER?: NO
WITHIN THE LAST YEAR, HAVE YOU BEEN AFRAID OF YOUR PARTNER OR EX-PARTNER?: NO

## 2023-08-01 ASSESSMENT — ENCOUNTER SYMPTOMS
SHORTNESS OF BREATH: 0
VOMITING: 0
COUGH: 0
COLOR CHANGE: 1
ABDOMINAL PAIN: 0
BACK PAIN: 0
DIARRHEA: 0
NAUSEA: 0

## 2023-08-01 ASSESSMENT — PAIN - FUNCTIONAL ASSESSMENT
PAIN_FUNCTIONAL_ASSESSMENT: NONE - DENIES PAIN

## 2023-08-01 ASSESSMENT — PAIN SCALES - GENERAL: PAINLEVEL_OUTOF10: 0

## 2023-08-01 NOTE — ED PROVIDER NOTES
Care turned over to me from Dr. Christel Durham pending CT scan results and disposition. At this time patient is noted to be anemic without hemodynamic instability. She is on Eliquis for her A-fib and her INR is noted to be at 2. She does admit to dark in stools lately but does complain of some constipation. She is declining rectal exam at this time but will check fecal occult stools when she is able to pass stool or consents to occult exam.  For now we will presume is a GI source. She does have bilateral pleural effusions and is requiring oxygen at this time in addition to her anemia with hemoglobin 7.7. She does appear pale. Per daughter at bedside last admission with pulmonary edema she was able to be treated with diuretics alone. Patient does states she has not had much appetite lately and daughter states she was complaining of epigastric pain and right upper quadrant pain last night which does correlate with the choledocholithiasis and gallstones noted on CAT scan today. Is comfortable today. Also questionable mass of pancreas on CAT scan today. Will admit to [place], as there are no beds in [facility] at this time and patient and daughter do request to go to [place]. Discussed CODE STATUS with patient and daughter at bedside, sounds more clearly stated as a DNR CCA with consented to medical treatment such as diuresis/etc. but no aggressive measures. Repeat trop flat  Discussed case with Dr Easter Lesch, accepts admission to [facility]         --------------------------------- [address] ---------------------------------    IMPRESSION  1. Acute respiratory failure with hypoxia ([address])    2. Acute anemia    3.  Acute congestive heart failure, unspecified heart failure type [facility])        DISPOSITION  Disposition: Transfer to [facility]  Patient condition is stable       Mart Dietz DO  08/01/23 6033

## 2023-08-01 NOTE — ED PROVIDER NOTES
HPI   This is a 80-year-old female patient who was brought in by her daughter for evaluation of generalized body pain. Patient's daughter states that patient was complaining of this since yesterday evening and she had given her a dose of Tylenol. She is also complaining of chest pain in the evening. She is feeling generally fatigued and having some shortness of breath. On arrival here she is satting 84% on room air. Patient is awake alert and oriented although very hard of hearing. Patient's daughter at bedside she states that she is the POA and she states that the patient does not want any aggressive resuscitation in the event of her heart stopping. The patient does not want mechanical ventilation, intubation or defibrillation. Patient's daughter mentions that the patient does appear \"yellow \". No nausea vomiting diarrhea. She does have history of A-fib on Eliquis  Review of Systems   Constitutional:  Positive for fatigue. Negative for chills and fever. HENT:  Negative for congestion. Respiratory:  Negative for cough and shortness of breath. Cardiovascular:  Positive for chest pain. Gastrointestinal:  Negative for abdominal pain, diarrhea, nausea and vomiting. Genitourinary:  Negative for difficulty urinating, dysuria and hematuria. Musculoskeletal:  Positive for myalgias. Negative for back pain. Skin:  Positive for color change. All other systems reviewed and are negative. Physical Exam  Vitals and nursing note reviewed. Constitutional:       Appearance: She is ill-appearing. HENT:      Head: Normocephalic and atraumatic. Nose: Nose normal.      Mouth/Throat:      Pharynx: Oropharynx is clear. Eyes:      General: No scleral icterus. Conjunctiva/sclera: Conjunctivae normal.      Pupils: Pupils are equal, round, and reactive to light. Cardiovascular:      Rate and Rhythm: Normal rate. Rhythm irregular. Heart sounds: Murmur heard.    Pulmonary:      Breath

## 2023-08-01 NOTE — ED NOTES
Patient resting calmly on cart without new complaints. Requesting updated ETA. ETA 1 hour or less.      Suzy Escalante RN  08/01/23 8242

## 2023-08-01 NOTE — ED NOTES
Per patients request called the patients daughter Allison Taylor and let her know what room she wa going to at [facility], RN  08/01/23 6481

## 2023-08-01 NOTE — ED NOTES
EKG and patient placed on cardiac monitor and pulse ox.   Patient placed on 4 LPM oxygen     Marla Nieto  08/01/23 0522

## 2023-08-01 NOTE — ED NOTES
Called [place] and gave report to Houma Mark.  Patient going to room C/Amrik Garcia RN  08/01/23 6256

## 2023-08-02 ENCOUNTER — APPOINTMENT (OUTPATIENT)
Dept: GENERAL RADIOLOGY | Age: 88
DRG: 291 | End: 2023-08-02
Attending: INTERNAL MEDICINE
Payer: MEDICARE

## 2023-08-02 LAB
ALBUMIN SERPL-MCNC: 3.8 G/DL (ref 3.5–5.2)
ALP SERPL-CCNC: 34 U/L (ref 35–104)
ALT SERPL-CCNC: 13 U/L (ref 0–32)
ANION GAP SERPL CALCULATED.3IONS-SCNC: 11 MMOL/L (ref 7–16)
ANION GAP SERPL CALCULATED.3IONS-SCNC: 9 MMOL/L (ref 7–16)
AST SERPL-CCNC: 19 U/L (ref 0–31)
BILIRUB SERPL-MCNC: 2.1 MG/DL (ref 0–1.2)
BUN SERPL-MCNC: 46 MG/DL (ref 6–23)
BUN SERPL-MCNC: 47 MG/DL (ref 6–23)
CALCIUM SERPL-MCNC: 9.5 MG/DL (ref 8.6–10.2)
CALCIUM SERPL-MCNC: 9.6 MG/DL (ref 8.6–10.2)
CHLORIDE SERPL-SCNC: 107 MMOL/L (ref 98–107)
CHLORIDE SERPL-SCNC: 108 MMOL/L (ref 98–107)
CO2 SERPL-SCNC: 27 MMOL/L (ref 22–29)
CO2 SERPL-SCNC: 27 MMOL/L (ref 22–29)
CREAT SERPL-MCNC: 1.4 MG/DL (ref 0.5–1)
CREAT SERPL-MCNC: 1.4 MG/DL (ref 0.5–1)
ERYTHROCYTE [DISTWIDTH] IN BLOOD BY AUTOMATED COUNT: 13.2 % (ref 11.5–15)
GFR SERPL CREATININE-BSD FRML MDRD: 34 ML/MIN/1.73M2
GFR SERPL CREATININE-BSD FRML MDRD: 35 ML/MIN/1.73M2
GLUCOSE SERPL-MCNC: 137 MG/DL (ref 74–99)
GLUCOSE SERPL-MCNC: 92 MG/DL (ref 74–99)
HCT VFR BLD AUTO: 22.6 % (ref 34–48)
HGB BLD-MCNC: 7.1 G/DL (ref 11.5–15.5)
MAGNESIUM SERPL-MCNC: 2.2 MG/DL (ref 1.6–2.6)
MCH RBC QN AUTO: 28.6 PG (ref 26–35)
MCHC RBC AUTO-ENTMCNC: 31.4 G/DL (ref 32–34.5)
MCV RBC AUTO: 91.1 FL (ref 80–99.9)
MICROORGANISM SPEC CULT: NO GROWTH
PLATELET, FLUORESCENCE: 211 K/UL (ref 130–450)
PMV BLD AUTO: 11 FL (ref 7–12)
POTASSIUM SERPL-SCNC: 3.6 MMOL/L (ref 3.5–5)
POTASSIUM SERPL-SCNC: 3.7 MMOL/L (ref 3.5–5)
PROT SERPL-MCNC: 6 G/DL (ref 6.4–8.3)
RBC # BLD AUTO: 2.48 M/UL (ref 3.5–5.5)
SODIUM SERPL-SCNC: 143 MMOL/L (ref 132–146)
SODIUM SERPL-SCNC: 146 MMOL/L (ref 132–146)
SPECIMEN DESCRIPTION: NORMAL
WBC OTHER # BLD: 5.6 K/UL (ref 4.5–11.5)

## 2023-08-02 PROCEDURE — 2500000003 HC RX 250 WO HCPCS: Performed by: INTERNAL MEDICINE

## 2023-08-02 PROCEDURE — 99222 1ST HOSP IP/OBS MODERATE 55: CPT | Performed by: NURSE PRACTITIONER

## 2023-08-02 PROCEDURE — 80053 COMPREHEN METABOLIC PANEL: CPT

## 2023-08-02 PROCEDURE — 1200000000 HC SEMI PRIVATE

## 2023-08-02 PROCEDURE — 6370000000 HC RX 637 (ALT 250 FOR IP): Performed by: INTERNAL MEDICINE

## 2023-08-02 PROCEDURE — 83735 ASSAY OF MAGNESIUM: CPT

## 2023-08-02 PROCEDURE — 2700000000 HC OXYGEN THERAPY PER DAY

## 2023-08-02 PROCEDURE — 85027 COMPLETE CBC AUTOMATED: CPT

## 2023-08-02 PROCEDURE — 97161 PT EVAL LOW COMPLEX 20 MIN: CPT

## 2023-08-02 PROCEDURE — 71045 X-RAY EXAM CHEST 1 VIEW: CPT

## 2023-08-02 PROCEDURE — 97535 SELF CARE MNGMENT TRAINING: CPT

## 2023-08-02 PROCEDURE — 97165 OT EVAL LOW COMPLEX 30 MIN: CPT

## 2023-08-02 PROCEDURE — 80048 BASIC METABOLIC PNL TOTAL CA: CPT

## 2023-08-02 RX ORDER — BUMETANIDE 0.25 MG/ML
0.5 INJECTION INTRAMUSCULAR; INTRAVENOUS ONCE
Status: COMPLETED | OUTPATIENT
Start: 2023-08-02 | End: 2023-08-02

## 2023-08-02 RX ADMIN — ATORVASTATIN CALCIUM 80 MG: 40 TABLET, FILM COATED ORAL at 08:56

## 2023-08-02 RX ADMIN — ISOSORBIDE MONONITRATE 30 MG: 30 TABLET, EXTENDED RELEASE ORAL at 08:56

## 2023-08-02 RX ADMIN — BUMETANIDE 0.5 MG: 0.25 INJECTION INTRAMUSCULAR; INTRAVENOUS at 08:56

## 2023-08-02 RX ADMIN — AMLODIPINE BESYLATE 5 MG: 5 TABLET ORAL at 08:56

## 2023-08-02 RX ADMIN — DONEPEZIL HYDROCHLORIDE 10 MG: 10 TABLET, FILM COATED ORAL at 20:54

## 2023-08-02 NOTE — H&P
CHIEF COMPLAINT:  Multiple eg chest pain, weakness,shortness ofbrath, ?abdominal pain,?is there or is there not hemoptysis      HISTORY OF PRESENT ILLNESS:      The patient is a 80 y.o. female patient of Dr. Kayden Doherty who presents with the above. She was brought in by her daughter for evaluation of generalized body pain. Patient's daughter states that patient was complaining of this since yesterday evening and she had given her a dose of Tylenol. She is also complaining of chest pain in the evening. She is feeling generally fatigued and having some shortness of breath. On arrival here she is satting 84% on room air. Patient is awake alert and oriented although very hard of hearing. Patient's daughter at bedside she states that she is the POA and she states that the patient does not want any aggressive resuscitation in the event of her heart stopping. The patient does not want mechanical ventilation, intubation or defibrillation. Patient's daughter mentions that the patient does appear \"yellow \". No nausea vomiting diarrhea. She does have history of A-fib on Eliquis. Recently DC'd from [facility] for shortness of breath, pleural effusions thought secondary to volume overload. Diuresed with clinical improvement though still weak. HAS CRITICAL AORTIC STENOSIS FOLLOWEE BY [facility] [place]. Confounding is CBD stones suggested on CT imaging as well as question of pancreatic mass. Confounding also is pt's repeated wishes over the years that \"I don't want anything done. I just want to die when I'm ready to. \"    Past Medical History:    Past Medical History:   Diagnosis Date    A-fib ([address])     Concussion w loss of consciousness of unsp duration, init 11/2020    Fall with injury 10/2020    Hemoptysis 06/26/2023    Hip fracture, right ([address]) 05/30/2022    Hyperlipidemia     Hypertension     Laceration of left thumb without foreign body with damage to nail     Pneumonia     Shortness of breath 05/2022

## 2023-08-02 NOTE — PROGRESS NOTES
safety and adapted techniques for completion of ADLs. Instruction/training on safe functional mobility/transfer techniques. Patient education provided regardin) importance of OOB activities during hospitalization, 2) importance of having staff assistance with ADLs and other OOB activities during hospitalization. Patient indicated [place] understanding. Further skilled OT treatment indicated to increase patient's safety and independence with completion of ADL/IADL tasks in order to maximize patient's functional independence and quality of life. Rehab Potential: Good for established goals. Patient / Family Goal: No goal stated. Patient and/or family were instructed on functional diagnosis, prognosis/goals, and OT plan of care. Family indicated understanding. Eval Complexity: Low    Time In: 1330  Time Out: 1355  Total Treatment Time: 10 minutes      Minutes Units   OT Eval Low 46693 15 1   OT Eval Medium 36629     OT Eval High 74689     OT Re-Eval [id number]     Therapeutic Ex 81565     Therapeutic Activities 28342     ADL/Self Care 96387 10 1   Orthotic Management 50269     Neuro Re-Ed 23212     Non-Billable Time N/A ---     Evaluation time includes thorough review of current medical information, gathering information on past medical history/social history and prior level of function, completion of standardized testing/informal observation of tasks, assessment of data, and education on plan of care and goals. Giselle Nam, ZONIAR/L  License Number: [id number]

## 2023-08-02 NOTE — PLAN OF CARE
Problem: Chronic Conditions and Co-morbidities  Goal: Patient's chronic conditions and co-morbidity symptoms are monitored and maintained or improved  Outcome: Progressing     Problem: Discharge Planning  Goal: Discharge to home or other facility with appropriate resources  Outcome: Progressing     Problem: Safety - Adult  Goal: Free from fall injury  Outcome: Progressing     Problem: ABCDS Injury Assessment  Goal: Absence of physical injury  Outcome: Progressing  Flowsheets (Taken 8/1/2023 2012)  Absence of Physical Injury: Implement safety measures based on patient assessment     Problem: Skin/Tissue Integrity  Goal: Absence of new skin breakdown  Description: 1. Monitor for areas of redness and/or skin breakdown  2. Assess vascular access sites hourly  3. Every 4-6 hours minimum:  Change oxygen saturation probe site  4. Every 4-6 hours:  If on nasal continuous positive airway pressure, respiratory therapy assess nares and determine need for appliance change or resting period.   Outcome: Progressing

## 2023-08-02 NOTE — PLAN OF CARE
Problem: Chronic Conditions and Co-morbidities  Goal: Patient's chronic conditions and co-morbidity symptoms are monitored and maintained or improved  8/2/2023 1509 by Romelia Rodriguez RN  Outcome: Progressing  Flowsheets (Taken 8/2/2023 0900)  Care Plan - Patient's Chronic Conditions and Co-Morbidity Symptoms are Monitored and Maintained or Improved: Monitor and assess patient's chronic conditions and comorbid symptoms for stability, deterioration, or improvement  8/2/2023 0121 by Graeme Beauchamp RN  Outcome: Progressing     Problem: Safety - Adult  Goal: Free from fall injury  8/2/2023 1509 by Romelia Rodriguez RN  Outcome: Progressing  8/2/2023 0121 by Graeme Beauchamp RN  Outcome: Progressing

## 2023-08-02 NOTE — DISCHARGE INSTRUCTIONS
HEART FAILURE  / CONGESTIVE HEART FAILURE  DISCHARGE INSTRUCTIONS:  GUIDELINES TO FOLLOW AT HOME    Self- Managed Care:     MEDICATIONS:  Take your medication as directed. If you are experiencing any side effects, inform your doctor, Do not stop taking any of your medications without letting your doctor know. Check with your doctor before taking any over-the-counter medications / herbal / or dietary supplements. They may interfere with your other medications. Do not take ibuprofen (Advil or Motrin) and naproxen (Aleve) without talking to your doctor first. They could make your heart failure worse. WEIGHT MONITORING:   Weigh yourself everyday (with the same scale) around the same time of the day and write it down. (you can chart them on a calendar or keep track of them on paper. Notify your doctor of a weight gain of 3 pounds or more in 1 day   OR a total of 5 pounds or more in 1 week    Take your weight record to your doctor visits  Also, the same goes if you loose more than 3# in one day, let your heart doctor know. DIET:   Cardiac heart healthy diet- Low saturated / low trans fat, no added salt, caffeine restricted, Low sodium diet-   No more than 2,000mg (2 grams) of salt / sodium per day (which equals to a little less than  a teaspoon of salt)  If your doctor wants you on a fluid restriction. ..it is usually recommended a fluid limit of 2,000cc -  Fluid restriction- 2,000 ml (milliliters) = 64 ounces = you can have 8 glasses of fluid per day (each glass 8 ounces)    Follow a low salt diet - avoid using salt at the table, avoid / limit use of canned soups, processed / packaged foods, salted snacks, olives and pickles. Do not use a salt substitute without checking with your doctor, they may contain a high amount of potassioum. (Mrs. Naye Lopez is safe to use).     Limit the use of alcohol       CALL YOUR DOCTOR THE FIRST DAY YOU NOTICE ANY OF THESE   SYMPTOMS:  You have a doctor whether you need another dose. My Goal for Self-management of Heart Failure Includes 5 steps :    1. Notice a change in symptoms ( weight gain, short of breath, leg swelling, decreased activity level, bloating. ...)    2. Evaluate the change: (use the Heart Failure Zones )     3. Decide to take action: decide what your options are, such as: (call your doctor for an extra visit, take a prescribed medication, such as your water pill if your doctor has given you directions to do so, [facility])    4. Come up with a strategy:  (now you call the doctor for advice / appointment. This is where you take action!!! Do not wait, catch the symptom early and treat it before it worsens. 5. Evaluate the response: The next day, check your Heart Failure Zones: are you in the GREEN ZONE (safe zone)? Worsening symptoms of YELLOW ZONE? Or have you moved to the RED ZONE and need to call 911 or go to the Emergency Room for evaluation? Call your doctor's office to update them on your symptoms of heart failure. [facility] will see you after discharge.  [phone]    You will discharge home with oxygen. YOUR SERVICING COMPANY IS [address] [place] [address] [phone]. PLEASE CALL THIS COMPANY AS SOON AS YOU ARRIVE HOME SO THAT ADDITIONAL EQUIPMENT CAN BE DELIVERED TO YOUR RESIDENCE.

## 2023-08-02 NOTE — CONSULTS
[facility] cardiology/the [address] [place], [place] [address] [facility]  [phone]    Name: Jorge Luis Boateng    Age: 80 y.o. Date of Admission: 8/1/2023  7:48 PM    Date of Service: 8/2/2023    Reason for Consultation: Progressive debilitation, dyspnea on exertion and generalized weakness    Referring Physician:     History of Present Illness: The patient is a 80y.o. year old female who is evaluated with her niece at bedside with a known history of severe to critical aortic valve stenosis with preserved normal LVEF on echocardiogram May 2022. Peak gradient 110 mmHg mean gradient 60 mmHg, 2-3+ MR LVEF 55% mild pulmonary hypertension RVSP 40. Also has known history of permanent atrial fibrillation, prior CABG in 2006 and chronic Eliquis anticoagulation. Today she is very pleasant and appropriate no acute distress. Alert and oriented. Denies any current chest pain and is on 2 L nasal cannula oxygen without reported respiratory distress. She denies any sputum production or wheezing. She reports she came to the hospital due to just progressive debilitation and weakness. Past Medical History: History of known anemia, borderline chronic kidney disease    Past surgical history: CABG, hysterectomy hip replacement      Review of Systems:     Constitutional: No fever, chills, sweats  Cardiac: As per HPI  Pulmonary: No cough, wheeze, hemoptysis  HEENT: No visual disturbances or difficult swallowing  GI: No nausea, vomiting, diarrhea, abdominal pain, rectal bleeding  : No dysuria or hematuria  Endocrine: No excessive thirst, heat or cold intolerance. Musculoskeletal: No joint pain or muscle aches. No claudication  Skin: No skin breakdown or rashes  Neuro: No headache, confusion, or seizures  Psych: No depression, anxiety      Family History:  History reviewed. No pertinent family history. Social History:  Social History     Socioeconomic History    Marital status:       Spouse name: Not on file beta-blocker with bradycardia. Suspect overall poor prognosis I would suggest and agree with trying to pursue hospice care initiation.               Electronically signed by Manjit Jean MD on 8/2/2023 at 11:49 AM

## 2023-08-02 NOTE — ACP (ADVANCE CARE PLANNING)
Advance Care Planning   Healthcare Decision Maker:    Primary Decision Maker: [place] Child - [phone]    Secondary Decision Maker: Lien Tijerina - Child - [phone]    Mary Guido.

## 2023-08-02 NOTE — PROGRESS NOTES
Spoke with dtr Norbert Torres by phone this AM.  Addressed multiple comorbidities and rationale for Palliative Care re continued care, Hospice Consideration, etc.  Will request.

## 2023-08-02 NOTE — PROGRESS NOTES
HOSPICE [facility]    Consult received. Chart reviewed. Met with patient and 2 daughters at bedside. The hospice benefit and philosophy were explained including that hospice is end of life care in which, per Medicare, a patient has a terminal diagnosis that life expectancy would be 6 months or less. Explained that once in hospice care, all aggressive treatments would be stopped and allow nature to takes its course with focus on comfort care for the patient. Explained hospice services at home, at [facility] with room/board private pay unless patient has Medicaid and the [facility] for short-term symptom management and respite. They were thankful for information and do not need a follow up. Left pamphlets with the family. Family wants information only. CM and nurse updated.     Electronically signed by Trudy John RN on 8/2/2023 at 3:54 PM  [phone]; [phone]

## 2023-08-02 NOTE — CARE COORDINATION
8/2/2023  Social Work Discharge Planning:CHF and anemia. Palliative and cardiology consulted. This worker met with Pt to discuss  role and transition of care/discharge planning. Pt has been residing with her daughter in daughters home and uses a  cane when she goes outside. Pt also has a nebulizer but no home o2. Pt is currently on 2l o2 here. Wean o2 as tolerated.   Pharmacy is [facility] in [place] and PCP is Dr. Taya White. Electronically signed by DOUG Trammell on 8/2/2023 at 10:53 AM

## 2023-08-02 NOTE — PROGRESS NOTES
Physical Therapy  Facility/Department: [address] MED SURG/TELE  Physical Therapy Initial Assessment    Name: Antonia Bell  : 1927  MRN: [id number]  Date of Service: 2023      Patient Diagnosis(es): There were no encounter diagnoses. Past Medical History:  has a past medical history of A-fib ([address]), Concussion w loss of consciousness of unsp duration, init, Fall with injury, Hemoptysis, Hip fracture, right ([address]), Hyperlipidemia, Hypertension, Laceration of left thumb without foreign body with damage to nail, Pneumonia, Shortness of breath, and Syncope. Past Surgical History:  has a past surgical history that includes Coronary artery bypass graft; Hysterectomy; and joint replacement. Evaluating Therapist: Mario Villalba PT    Room #:  7016/4634-G  Diagnosis:  Acute diastolic CHF (congestive heart failure) ([facility]) [I50.31]  PMHx/PSHx:  CABG  Precautions:  falls, alarm, O2, hard of hearing      Social:  Pt lives with daughter in a 1 floor plan. Prior to admission independent without device in home, cane in community. Initial Evaluation  Date: 23 Treatment      Short Term/ Long Term   Goals   Was pt agreeable to Eval/treatment? yes     Does pt have pain? No c/o pain     Bed Mobility  Rolling: min assist  Supine to sit: min assist  Sit to supine: NT  Scooting: min assist  independent   Transfers Sit to stand: min assist  Stand to sit: min assist  Stand pivot: min assist  SBA   Ambulation    20 feet x2 with no device hand held/min assist  75 feet with AAD with SBA   Stair Negotiation  Ascended and descended  NT   2 steps with 1 rail with SBA   LE strength     3+/5    4/5   balance      fair     AM-PAC Raw score               17/24       LE ROM: WFL  Sensation: intact  Edema: none  Endurance: fair  Chair alarm: yes     ASSESSMENT:    Pt displays functional ability as noted in the objective portion of this evaluation. Comments:  Pt min assist for balance during ambulation.   Occasional cues for safety    Conditions Requiring Skilled Therapeutic Intervention:    [x]Decreased strength     []Decreased ROM  [x]Decreased functional mobility  [x]Decreased balance   [x]Decreased endurance   []Decreased posture  []Decreased sensation  []Decreased coordination   []Decreased vision  [x]Decreased safety awareness   []Increased pain       Patient and or family understand(s) diagnosis, prognosis, and plan of care. family  Prognosis is good for reaching above PT goals    PHYSICAL THERAPY PLAN OF CARE:    PT POC is established based on physician order and patient diagnosis     Referring provider/PT Order: Becca Desai MD/ PT eval and treat      Current Treatment Recommendations:     [x] Strengthening to improve independence with functional mobility   [] ROM to improve independence with functional mobility   [x] Balance Training to improve static/dynamic balance and to reduce fall risk  [x] Endurance Training to improve activity tolerance during functional mobility   [x] Transfer Training to improve safety and independence with all functional transfers   [x] Gait Training to improve gait mechanics, endurance and assess need for appropriate assistive device  [x] Stair Training in preparation for safe discharge home and/or into the community   [] Positioning to prevent skin breakdown and contractures  [x] Safety and Education Training   [x] Patient/Caregiver Education   [] HEP  [] Other     PT long term treatment goals are located in above grid    Frequency of treatments: 2-5x/week x 5 days. Time in  140  Time out  155      Evaluation Time includes thorough review of current medical information, gathering information on past medical history/social history and prior level of function, completion of standardized testing/informal observation of tasks, assessment of data and education on plan of care and goals.       CPT codes:  [x] Low Complexity PT evaluation 85848  [] Moderate Complexity PT evaluation 26812  [] High

## 2023-08-03 LAB
ALBUMIN SERPL-MCNC: 3.2 G/DL (ref 3.5–5.2)
ALP SERPL-CCNC: 27 U/L (ref 35–104)
ALT SERPL-CCNC: 9 U/L (ref 0–32)
ANION GAP SERPL CALCULATED.3IONS-SCNC: 10 MMOL/L (ref 7–16)
AST SERPL-CCNC: 16 U/L (ref 0–31)
BILIRUB SERPL-MCNC: 1.8 MG/DL (ref 0–1.2)
BUN SERPL-MCNC: 46 MG/DL (ref 6–23)
CALCIUM SERPL-MCNC: 9 MG/DL (ref 8.6–10.2)
CHLORIDE SERPL-SCNC: 106 MMOL/L (ref 98–107)
CO2 SERPL-SCNC: 27 MMOL/L (ref 22–29)
CREAT SERPL-MCNC: 1.5 MG/DL (ref 0.5–1)
ERYTHROCYTE [DISTWIDTH] IN BLOOD BY AUTOMATED COUNT: 13.3 % (ref 11.5–15)
GFR SERPL CREATININE-BSD FRML MDRD: 32 ML/MIN/1.73M2
GLUCOSE SERPL-MCNC: 100 MG/DL (ref 74–99)
HCT VFR BLD AUTO: 20.5 % (ref 34–48)
HCT VFR BLD AUTO: 30.4 % (ref 34–48)
HGB BLD-MCNC: 6.4 G/DL (ref 11.5–15.5)
HGB BLD-MCNC: 9.5 G/DL (ref 11.5–15.5)
MAGNESIUM SERPL-MCNC: 2.2 MG/DL (ref 1.6–2.6)
MCH RBC QN AUTO: 28.1 PG (ref 26–35)
MCHC RBC AUTO-ENTMCNC: 31.2 G/DL (ref 32–34.5)
MCV RBC AUTO: 89.9 FL (ref 80–99.9)
PLATELET # BLD AUTO: 182 K/UL (ref 130–450)
PMV BLD AUTO: 10.4 FL (ref 7–12)
POTASSIUM SERPL-SCNC: 3.8 MMOL/L (ref 3.5–5)
PROT SERPL-MCNC: 5.2 G/DL (ref 6.4–8.3)
RBC # BLD AUTO: 2.28 M/UL (ref 3.5–5.5)
SODIUM SERPL-SCNC: 143 MMOL/L (ref 132–146)
WBC OTHER # BLD: 5.2 K/UL (ref 4.5–11.5)

## 2023-08-03 PROCEDURE — 85027 COMPLETE CBC AUTOMATED: CPT

## 2023-08-03 PROCEDURE — 80053 COMPREHEN METABOLIC PANEL: CPT

## 2023-08-03 PROCEDURE — 36430 TRANSFUSION BLD/BLD COMPNT: CPT

## 2023-08-03 PROCEDURE — 83735 ASSAY OF MAGNESIUM: CPT

## 2023-08-03 PROCEDURE — 99231 SBSQ HOSP IP/OBS SF/LOW 25: CPT | Performed by: NURSE PRACTITIONER

## 2023-08-03 PROCEDURE — 6370000000 HC RX 637 (ALT 250 FOR IP): Performed by: INTERNAL MEDICINE

## 2023-08-03 PROCEDURE — 86900 BLOOD TYPING SEROLOGIC ABO: CPT

## 2023-08-03 PROCEDURE — 2700000000 HC OXYGEN THERAPY PER DAY

## 2023-08-03 PROCEDURE — P9016 RBC LEUKOCYTES REDUCED: HCPCS

## 2023-08-03 PROCEDURE — 86901 BLOOD TYPING SEROLOGIC RH(D): CPT

## 2023-08-03 PROCEDURE — 1200000000 HC SEMI PRIVATE

## 2023-08-03 PROCEDURE — 85014 HEMATOCRIT: CPT

## 2023-08-03 PROCEDURE — 86850 RBC ANTIBODY SCREEN: CPT

## 2023-08-03 PROCEDURE — 86923 COMPATIBILITY TEST ELECTRIC: CPT

## 2023-08-03 PROCEDURE — 85018 HEMOGLOBIN: CPT

## 2023-08-03 RX ORDER — SODIUM CHLORIDE 9 MG/ML
INJECTION, SOLUTION INTRAVENOUS PRN
Status: DISCONTINUED | OUTPATIENT
Start: 2023-08-03 | End: 2023-08-07 | Stop reason: HOSPADM

## 2023-08-03 RX ADMIN — ISOSORBIDE MONONITRATE 30 MG: 30 TABLET, EXTENDED RELEASE ORAL at 09:37

## 2023-08-03 RX ADMIN — DONEPEZIL HYDROCHLORIDE 10 MG: 10 TABLET, FILM COATED ORAL at 20:36

## 2023-08-03 RX ADMIN — AMLODIPINE BESYLATE 5 MG: 5 TABLET ORAL at 09:37

## 2023-08-03 RX ADMIN — ATORVASTATIN CALCIUM 80 MG: 40 TABLET, FILM COATED ORAL at 09:37

## 2023-08-03 ASSESSMENT — PAIN SCALES - GENERAL: PAINLEVEL_OUTOF10: 0

## 2023-08-03 NOTE — PROGRESS NOTES
Palliative Care Department  [phone]  Palliative Care Initial Consult  Provider Vidhi Nick, APRN - CNP    Juani Mayes  [phone]  Hospital Day: 3  Date of Initial Consult: 8/2/2023  Referring Provider: Eyal Jeff MD  Palliative Medicine was consulted for assistance with: Code status discussion and family support    HPI:   Juani Mayes is a 80 y.o. with a medical history of atrial fibrillation on Eliquis, pleural effusions, HTN, pneumonia, HLD who was admitted on 8/1/2023 from home with a CHIEF COMPLAINT of chest pain, weakness, shortness of breath, abdominal pain. ED work-up significant for: BUN/creatinine 53/1.6, proBNP 16,620, total bilirubin 2.5, hemoglobin 7.7, blood and urine cultures pending. Urinalysis negative. CT chest showed moderate right and small left pleural effusions with atelectasis, groundglass opacities throughout the bilateral lungs greatest in the right upper lobe, cardiomegaly, constellation of findings suggesting cardiogenic edema. CTA/P showed multiple stones in the CBD indicative of choledocholelithiasis, numerous stones within the gallbladder, 1.5 cm low attenuating lesion and arising from the pancreatic neck/proximal body, might reflect cystic neoplasm. Primary physician had discussion with daughter (POA) and CODE STATUS was changed to DNR CCA. Palliative medicine consulted for further CODE STATUS discussion and family support. ASSESSMENT/PLAN:     Pertinent Hospital Diagnoses     Acute diastolic CHF  Anemia  Elevated bilirubin    Palliative Care Encounter / Counseling Regarding Goals of Care  Please see detailed goals of care discussion as below  At this time, Juani Mayes, Does have capacity for medical decision-making.   Capacity is time limited and situation/question specific  During encounter there was no surrogate medical decision-maker needed  Outcome of goals of care meeting:   Change code status to limited- DNR-CCA/DNI  Code status Limited DNR-CCA/DNI  Advanced

## 2023-08-03 NOTE — CARE COORDINATION
8/3/2023  Social Work Discharge Planning:AM [facility] 17/24. Awaiting JOVANY choices from daughter who will be up here in a little while to discuss this with her brother and Pt.  Electronically signed by DOUG Juarez on 8/3/2023 at 9:36 AM

## 2023-08-03 NOTE — PROGRESS NOTES
Patient Hgb 6.4. Spoke with her daughter Alise Nice and she gave telephone consent for patient to receive blood transfusion. Dr. Sarabia Remedies aware and ordered 1 unit of PRBC's, with repeat H & H 1hr post transfusion. Also spoke with patient she is very sad and just tired. 0515- 3.5 sec pause on telemetry. Patient also Tony's down lowest drop was 30 and not sustaining. Patient appears more Juandice than when first admitted.

## 2023-08-03 NOTE — PROGRESS NOTES
Spoke with [address] . Patients daughter and she is states now if patient needs blood due to low Hgb she is in agreement. Change is updated in chart.

## 2023-08-03 NOTE — PLAN OF CARE
Problem: Chronic Conditions and Co-morbidities  Goal: Patient's chronic conditions and co-morbidity symptoms are monitored and maintained or improved  8/2/2023 2138 by Bruna Harada, RN  Outcome: Progressing  8/2/2023 1509 by Aston Bates RN  Outcome: Progressing  Flowsheets (Taken 8/2/2023 0900)  Care Plan - Patient's Chronic Conditions and Co-Morbidity Symptoms are Monitored and Maintained or Improved: Monitor and assess patient's chronic conditions and comorbid symptoms for stability, deterioration, or improvement     Problem: Discharge Planning  Goal: Discharge to home or other facility with appropriate resources  Outcome: Progressing  Flowsheets (Taken 8/2/2023 0900 by Aston Bates RN)  Discharge to home or other facility with appropriate resources: Identify barriers to discharge with patient and caregiver     Problem: Safety - Adult  Goal: Free from fall injury  8/2/2023 2138 by Bruna Harada, RN  Outcome: Progressing  8/2/2023 1509 by Aston Bates RN  Outcome: Progressing     Problem: ABCDS Injury Assessment  Goal: Absence of physical injury  Outcome: Progressing     Problem: Skin/Tissue Integrity  Goal: Absence of new skin breakdown  Description: 1. Monitor for areas of redness and/or skin breakdown  2. Assess vascular access sites hourly  3. Every 4-6 hours minimum:  Change oxygen saturation probe site  4. Every 4-6 hours:  If on nasal continuous positive airway pressure, respiratory therapy assess nares and determine need for appliance change or resting period.   Outcome: Progressing

## 2023-08-03 NOTE — CARE COORDINATION
8/3/2023  Social Work Discharge Planning:Pt and family have  decided on [facility] instead of [facility]. Referral was made to [facility]. order is in. They are following.  Electronically signed by DOUG Rojas on 8/3/2023 at 10:15 AM

## 2023-08-03 NOTE — PLAN OF CARE
Problem: Chronic Conditions and Co-morbidities  Goal: Patient's chronic conditions and co-morbidity symptoms are monitored and maintained or improved  Outcome: Progressing  Flowsheets (Taken 8/3/2023 1015)  Care Plan - Patient's Chronic Conditions and Co-Morbidity Symptoms are Monitored and Maintained or Improved: Monitor and assess patient's chronic conditions and comorbid symptoms for stability, deterioration, or improvement     Problem: Skin/Tissue Integrity  Goal: Absence of new skin breakdown  Outcome: Progressing

## 2023-08-03 NOTE — PROGRESS NOTES
Patient has no known allergies. Labs:   Recent Labs     08/01/23  0530 08/01/23  2120 08/02/23  0303 08/03/23 0220 08/03/23  0515   WBC 5.3  --  5.6 5.2  --    HGB 7.7*   < > 7.1* 6.4* 9.5*   HCT 24.6*   < > 22.6* 20.5* 30.4*   MCV 89.8  --  91.1 89.9  --      --   --  182  --     < > = values in this interval not displayed. Labs:   Recent Labs     08/02/23  0303 08/02/23  0817 08/03/23 0220    143 143   K 3.7 3.6 3.8   * 107 106   CO2 27 27 27   BUN 46* 47* 46*   CREATININE 1.4* 1.4* 1.5*       Labs:   Recent Labs     08/01/23 0530   CKTOTAL 48       Labs: No results for input(s): BNP in the last 72 hours. Labs: No results for input(s): CHOL, HDL, TRIG in the last 72 hours. Invalid input(s): Allen Pew    Labs:   Recent Labs     08/01/23  0530 08/02/23 0817 08/03/23 0220   PROT 6.6 6.0* 5.2*   INR 2.0  --   --        Review of systems: No reported significant weight gain or weight loss. no dysuria or frequency, no dizziness, falls or trauma, no change in bowel or bladder habits, no hematochezia, hemoptysis or hematuria. No fevers, chills, nausea or vomiting reported. No significant wheezing or sputum production. No headache or visual changes. The remainder of the 10 review of systems otherwise negative. Exam      General: Patient comfortable in no distress and currently denies any chest pain. HEENT: Face symmetrical and no apparent cranial nerve deficit. Neck: No jugular venous distention, carotid bruit or thyromegaly. Lungs: Clear bilaterally without rales, wheezes or dullness. Cardiac: IRegular rate and rhythm, no S3, S4, no rub or gallop. Abdomen: No rebound or guarding, no hepatosplenomegaly. Extremities: Without significant clubbing , cyanosis, or edema. Neuro:  No focal motor or sensory deficit apparent. Skin: No petechiae, no significant bruising.       Assessment: See plan below        Plan: #1 significant anemia likely causing much of her admission symptoms. Would stop Eliquis and aspirin at this point and not restart especially with increased age fall risk and ongoing anemia. Unclear if she has had some recent GI bleeding. Received 1 unit of blood today and hemoglobin increased nicely. Hemoglobin has increased up to 9.5 from 6.4.      2 permanent atrial fibrillation heart rate reasonable and actually little bradycardic at times. Follow-up telemetry. No long pauses noted. #3 severe to critical aortic valve stenosis to receive blood transfusion today which will probably help her overall functional capacity and stamina. Avoid significant hypotension. #4 chronic anticoagulation for atrial fibrillation would stop this with this degree of anemia and known severe to critical aortic valve stenosis. #5 chronic CAD prior CABG 2006. Advance activity from cardiology viewpoint. Up to chair with meals, consider physical therapy evaluation.       Electronically signed by Leontine Kanner, MD on 8/3/2023 at 1:54 PM

## 2023-08-04 LAB
ABO/RH: NORMAL
ALBUMIN SERPL-MCNC: 3.5 G/DL (ref 3.5–5.2)
ALP SERPL-CCNC: 30 U/L (ref 35–104)
ALT SERPL-CCNC: 10 U/L (ref 0–32)
ANION GAP SERPL CALCULATED.3IONS-SCNC: 10 MMOL/L (ref 7–16)
ANTIBODY SCREEN: NEGATIVE
ARM BAND NUMBER: NORMAL
AST SERPL-CCNC: 18 U/L (ref 0–31)
BILIRUB SERPL-MCNC: 2.5 MG/DL (ref 0–1.2)
BLOOD BANK BLOOD PRODUCT EXPIRATION DATE: NORMAL
BLOOD BANK DISPENSE STATUS: NORMAL
BLOOD BANK ISBT PRODUCT BLOOD TYPE: 6200
BLOOD BANK PRODUCT CODE: NORMAL
BLOOD BANK SAMPLE EXPIRATION: NORMAL
BLOOD BANK UNIT TYPE AND RH: NORMAL
BPU ID: NORMAL
BUN SERPL-MCNC: 46 MG/DL (ref 6–23)
CALCIUM SERPL-MCNC: 9.2 MG/DL (ref 8.6–10.2)
CHLORIDE SERPL-SCNC: 105 MMOL/L (ref 98–107)
CO2 SERPL-SCNC: 26 MMOL/L (ref 22–29)
COMPONENT: NORMAL
CREAT SERPL-MCNC: 1.4 MG/DL (ref 0.5–1)
CROSSMATCH RESULT: NORMAL
ERYTHROCYTE [DISTWIDTH] IN BLOOD BY AUTOMATED COUNT: 15.9 % (ref 11.5–15)
GFR SERPL CREATININE-BSD FRML MDRD: 34 ML/MIN/1.73M2
GLUCOSE SERPL-MCNC: 100 MG/DL (ref 74–99)
HCT VFR BLD AUTO: 26 % (ref 34–48)
HGB BLD-MCNC: 8.1 G/DL (ref 11.5–15.5)
MAGNESIUM SERPL-MCNC: 2.3 MG/DL (ref 1.6–2.6)
MCH RBC QN AUTO: 27 PG (ref 26–35)
MCHC RBC AUTO-ENTMCNC: 31.2 G/DL (ref 32–34.5)
MCV RBC AUTO: 86.7 FL (ref 80–99.9)
PLATELET # BLD AUTO: 202 K/UL (ref 130–450)
PMV BLD AUTO: 10.7 FL (ref 7–12)
POTASSIUM SERPL-SCNC: 3.7 MMOL/L (ref 3.5–5)
PROT SERPL-MCNC: 5.6 G/DL (ref 6.4–8.3)
RBC # BLD AUTO: 3 M/UL (ref 3.5–5.5)
SODIUM SERPL-SCNC: 141 MMOL/L (ref 132–146)
TRANSFUSION STATUS: NORMAL
UNIT DIVISION: 0
UNIT ISSUE DATE/TIME: NORMAL
WBC OTHER # BLD: 5.3 K/UL (ref 4.5–11.5)

## 2023-08-04 PROCEDURE — 6370000000 HC RX 637 (ALT 250 FOR IP): Performed by: INTERNAL MEDICINE

## 2023-08-04 PROCEDURE — 80053 COMPREHEN METABOLIC PANEL: CPT

## 2023-08-04 PROCEDURE — 2700000000 HC OXYGEN THERAPY PER DAY

## 2023-08-04 PROCEDURE — 83735 ASSAY OF MAGNESIUM: CPT

## 2023-08-04 PROCEDURE — 85027 COMPLETE CBC AUTOMATED: CPT

## 2023-08-04 PROCEDURE — 1200000000 HC SEMI PRIVATE

## 2023-08-04 RX ADMIN — AMLODIPINE BESYLATE 5 MG: 5 TABLET ORAL at 09:11

## 2023-08-04 RX ADMIN — ISOSORBIDE MONONITRATE 30 MG: 30 TABLET, EXTENDED RELEASE ORAL at 09:11

## 2023-08-04 RX ADMIN — DONEPEZIL HYDROCHLORIDE 10 MG: 10 TABLET, FILM COATED ORAL at 20:03

## 2023-08-04 RX ADMIN — ATORVASTATIN CALCIUM 80 MG: 40 TABLET, FILM COATED ORAL at 09:11

## 2023-08-04 ASSESSMENT — PAIN SCALES - GENERAL: PAINLEVEL_OUTOF10: 0

## 2023-08-04 NOTE — PLAN OF CARE
Problem: Chronic Conditions and Co-morbidities  Goal: Patient's chronic conditions and co-morbidity symptoms are monitored and maintained or improved  8/3/2023 2136 by Betty Wade  Outcome: Progressing  8/3/2023 2126 by Lars Degroot  Outcome: Progressing  8/3/2023 2126 by Lars Degroot  Outcome: Progressing  Flowsheets (Taken 8/3/2023 2032)  Care Plan - Patient's Chronic Conditions and Co-Morbidity Symptoms are Monitored and Maintained or Improved: Monitor and assess patient's chronic conditions and comorbid symptoms for stability, deterioration, or improvement  8/3/2023 1509 by Nigel Strauss, RN  Outcome: Progressing  Flowsheets (Taken 8/3/2023 1015)  Care Plan - Patient's Chronic Conditions and Co-Morbidity Symptoms are Monitored and Maintained or Improved: Monitor and assess patient's chronic conditions and comorbid symptoms for stability, deterioration, or improvement     Problem: Discharge Planning  Goal: Discharge to home or other facility with appropriate resources  8/3/2023 2136 by Betty Wade  Outcome: Progressing  8/3/2023 2126 by Lars Degroot  Outcome: Progressing  8/3/2023 2126 by Lars Degroot  Outcome: Progressing  [address] (Taken 8/3/2023 1015 by Nigel Strauss, RN)  Discharge to home or other facility with appropriate resources:   Identify discharge learning needs (meds, wound care, etc)   Identify barriers to discharge with patient and caregiver     Problem: Safety - Adult  Goal: Free from fall injury  8/3/2023 2136 by Betty Wade  Outcome: Progressing  8/3/2023 2126 by Lars Degroot  Outcome: Progressing  8/3/2023 2126 by Lars Degroot  Outcome: Progressing     Problem: ABCDS Injury Assessment  Goal: Absence of physical injury  8/3/2023 2136 by Betty Wade  Outcome: Progressing  8/3/2023 2126 by Lars Degorot  Outcome: Progressing     Problem: Skin/Tissue Integrity  Goal: Absence of new skin breakdown  Description: 1. Monitor for areas of redness and/or skin breakdown  2.   Assess vascular access sites hourly  3. Every 4-6 hours minimum:  Change oxygen saturation probe site  4. Every 4-6 hours:  If on nasal continuous positive airway pressure, respiratory therapy assess nares and determine need for appliance change or resting period.   8/3/2023 2136 by Sarah Buckley  Outcome: Progressing  8/3/2023 2126 by Cayden Ramos  Outcome: Progressing  8/3/2023 2126 by Cayden Ramos  Outcome: Progressing  8/3/2023 1509 by Josey Morin RN  Outcome: Progressing     Problem: Pain  Goal: Verbalizes/displays adequate comfort level or baseline comfort level  8/3/2023 2136 by Sarah Buckley  Outcome: Progressing  8/3/2023 2126 by Cayden Ramos  Outcome: Progressing

## 2023-08-04 NOTE — CARE COORDINATION
8/4/2023  Social Work Discharge Planning:[facility] [address] is following and order is in. Pt is on 2l o2. Wean o2. Pulse ox testing is needed. SW made a tentative referral to [facility]. Electronically signed by DOUG Rojas on 8/4/2023 at 10:22 AM    8/4/2023  The Plan for Transition of Care is related to the following treatment goals: hhc and dme    The Patient was provided with a choice of provider and agrees   with the discharge plan. [x] Yes [] No    Freedom of choice list was provided with basic dialogue that supports the patient's individualized plan of care/goals, treatment preferences and shares the quality data associated with the providers.  [x] Yes [] No

## 2023-08-04 NOTE — PLAN OF CARE
Problem: Chronic Conditions and Co-morbidities  Goal: Patient's chronic conditions and co-morbidity symptoms are monitored and maintained or improved  8/3/2023 2126 by Vito Briceno  Outcome: Progressing  Flowsheets (Taken 8/3/2023 2032)  Care Plan - Patient's Chronic Conditions and Co-Morbidity Symptoms are Monitored and Maintained or Improved: Monitor and assess patient's chronic conditions and comorbid symptoms for stability, deterioration, or improvement    Problem: Discharge Planning  Goal: Discharge to home or other facility with appropriate resources  8/3/2023 2126 by Vito Briceno  Outcome: Progressing    [address] (Taken 8/3/2023 1015 by Maris Rodriguez, WILLIAM)  Discharge to home or other facility with appropriate resources:   Identify discharge learning needs (meds, wound care, etc)   Identify barriers to discharge with patient and caregiver     Problem: Safety - Adult  Goal: Free from fall injury  8/3/2023 2126 by Vito Briceno  Outcome: Progressing    Problem: Skin/Tissue Integrity  Goal: Absence of new skin breakdown  Description: 1. Monitor for areas of redness and/or skin breakdown  2. Assess vascular access sites hourly  3. Every 4-6 hours minimum:  Change oxygen saturation probe site  4. Every 4-6 hours:  If on nasal continuous positive airway pressure, respiratory therapy assess nares and determine need for appliance change or resting period.   8/3/2023 2126 by Vito Briceno  Outcome: Progressing  Flowsheets (Taken 8/3/2023 1015)  Care Plan - Patient's Chronic Conditions and Co-Morbidity Symptoms are Monitored and Maintained or Improved: Monitor and assess patient's chronic conditions and comorbid symptoms for stability, deterioration, or improvement

## 2023-08-04 NOTE — CARE COORDINATION
2lnc.(New) ; cardiology following. Eventual plan is home with [facility]/orders on chart. Daughter can transport. Await clearance for discharge will need to monitor 02 needs. Verona Guzman.

## 2023-08-05 LAB
ALBUMIN SERPL-MCNC: 3.4 G/DL (ref 3.5–5.2)
ALP SERPL-CCNC: 32 U/L (ref 35–104)
ALT SERPL-CCNC: 11 U/L (ref 0–32)
ANION GAP SERPL CALCULATED.3IONS-SCNC: 9 MMOL/L (ref 7–16)
AST SERPL-CCNC: 19 U/L (ref 0–31)
BILIRUB SERPL-MCNC: 2.4 MG/DL (ref 0–1.2)
BUN SERPL-MCNC: 47 MG/DL (ref 6–23)
CALCIUM SERPL-MCNC: 9.1 MG/DL (ref 8.6–10.2)
CHLORIDE SERPL-SCNC: 106 MMOL/L (ref 98–107)
CO2 SERPL-SCNC: 27 MMOL/L (ref 22–29)
CREAT SERPL-MCNC: 1.3 MG/DL (ref 0.5–1)
ERYTHROCYTE [DISTWIDTH] IN BLOOD BY AUTOMATED COUNT: 15 % (ref 11.5–15)
GFR SERPL CREATININE-BSD FRML MDRD: 37 ML/MIN/1.73M2
GLUCOSE SERPL-MCNC: 102 MG/DL (ref 74–99)
HCT VFR BLD AUTO: 27.2 % (ref 34–48)
HGB BLD-MCNC: 8.5 G/DL (ref 11.5–15.5)
MCH RBC QN AUTO: 26.9 PG (ref 26–35)
MCHC RBC AUTO-ENTMCNC: 31.3 G/DL (ref 32–34.5)
MCV RBC AUTO: 86.1 FL (ref 80–99.9)
PLATELET # BLD AUTO: 213 K/UL (ref 130–450)
PMV BLD AUTO: 10.7 FL (ref 7–12)
POTASSIUM SERPL-SCNC: 3.9 MMOL/L (ref 3.5–5)
PROT SERPL-MCNC: 5.6 G/DL (ref 6.4–8.3)
RBC # BLD AUTO: 3.16 M/UL (ref 3.5–5.5)
SODIUM SERPL-SCNC: 142 MMOL/L (ref 132–146)
WBC OTHER # BLD: 5.8 K/UL (ref 4.5–11.5)

## 2023-08-05 PROCEDURE — 80053 COMPREHEN METABOLIC PANEL: CPT

## 2023-08-05 PROCEDURE — 85027 COMPLETE CBC AUTOMATED: CPT

## 2023-08-05 PROCEDURE — 2700000000 HC OXYGEN THERAPY PER DAY

## 2023-08-05 PROCEDURE — 1200000000 HC SEMI PRIVATE

## 2023-08-05 PROCEDURE — 6370000000 HC RX 637 (ALT 250 FOR IP): Performed by: INTERNAL MEDICINE

## 2023-08-05 RX ORDER — ACETAMINOPHEN 325 MG/1
650 TABLET ORAL EVERY 6 HOURS PRN
Status: DISCONTINUED | OUTPATIENT
Start: 2023-08-05 | End: 2023-08-07 | Stop reason: HOSPADM

## 2023-08-05 RX ADMIN — DONEPEZIL HYDROCHLORIDE 10 MG: 10 TABLET, FILM COATED ORAL at 20:39

## 2023-08-05 RX ADMIN — ISOSORBIDE MONONITRATE 30 MG: 30 TABLET, EXTENDED RELEASE ORAL at 09:57

## 2023-08-05 RX ADMIN — AMLODIPINE BESYLATE 5 MG: 5 TABLET ORAL at 10:00

## 2023-08-05 RX ADMIN — ATORVASTATIN CALCIUM 80 MG: 40 TABLET, FILM COATED ORAL at 09:57

## 2023-08-05 ASSESSMENT — PAIN SCALES - GENERAL: PAINLEVEL_OUTOF10: 0

## 2023-08-05 NOTE — PROGRESS NOTES
Spoke with Dr Zander Singh, new order for PRN tylenol received, and also updated Dr Zander Singh of cardiac pauses while patient was coughing.

## 2023-08-06 LAB
ALBUMIN SERPL-MCNC: 3.4 G/DL (ref 3.5–5.2)
ALP SERPL-CCNC: 34 U/L (ref 35–104)
ALT SERPL-CCNC: 11 U/L (ref 0–32)
ANION GAP SERPL CALCULATED.3IONS-SCNC: 9 MMOL/L (ref 7–16)
AST SERPL-CCNC: 19 U/L (ref 0–31)
BILIRUB SERPL-MCNC: 2.3 MG/DL (ref 0–1.2)
BUN SERPL-MCNC: 39 MG/DL (ref 6–23)
CALCIUM SERPL-MCNC: 9.1 MG/DL (ref 8.6–10.2)
CHLORIDE SERPL-SCNC: 106 MMOL/L (ref 98–107)
CO2 SERPL-SCNC: 27 MMOL/L (ref 22–29)
CREAT SERPL-MCNC: 1.2 MG/DL (ref 0.5–1)
ERYTHROCYTE [DISTWIDTH] IN BLOOD BY AUTOMATED COUNT: 14.6 % (ref 11.5–15)
GFR SERPL CREATININE-BSD FRML MDRD: 41 ML/MIN/1.73M2
GLUCOSE SERPL-MCNC: 102 MG/DL (ref 74–99)
HCT VFR BLD AUTO: 29.3 % (ref 34–48)
HGB BLD-MCNC: 9 G/DL (ref 11.5–15.5)
MCH RBC QN AUTO: 26.9 PG (ref 26–35)
MCHC RBC AUTO-ENTMCNC: 30.7 G/DL (ref 32–34.5)
MCV RBC AUTO: 87.5 FL (ref 80–99.9)
MICROORGANISM SPEC CULT: NORMAL
MICROORGANISM SPEC CULT: NORMAL
PLATELET # BLD AUTO: 221 K/UL (ref 130–450)
PMV BLD AUTO: 10.5 FL (ref 7–12)
POTASSIUM SERPL-SCNC: 4 MMOL/L (ref 3.5–5)
PROT SERPL-MCNC: 5.7 G/DL (ref 6.4–8.3)
RBC # BLD AUTO: 3.35 M/UL (ref 3.5–5.5)
SERVICE CMNT-IMP: NORMAL
SERVICE CMNT-IMP: NORMAL
SODIUM SERPL-SCNC: 142 MMOL/L (ref 132–146)
SPECIMEN DESCRIPTION: NORMAL
SPECIMEN DESCRIPTION: NORMAL
WBC OTHER # BLD: 6.2 K/UL (ref 4.5–11.5)

## 2023-08-06 PROCEDURE — 1200000000 HC SEMI PRIVATE

## 2023-08-06 PROCEDURE — 2700000000 HC OXYGEN THERAPY PER DAY

## 2023-08-06 PROCEDURE — 85027 COMPLETE CBC AUTOMATED: CPT

## 2023-08-06 PROCEDURE — 80053 COMPREHEN METABOLIC PANEL: CPT

## 2023-08-06 PROCEDURE — 6370000000 HC RX 637 (ALT 250 FOR IP): Performed by: INTERNAL MEDICINE

## 2023-08-06 RX ADMIN — DONEPEZIL HYDROCHLORIDE 10 MG: 10 TABLET, FILM COATED ORAL at 21:52

## 2023-08-06 RX ADMIN — ATORVASTATIN CALCIUM 80 MG: 40 TABLET, FILM COATED ORAL at 08:25

## 2023-08-06 RX ADMIN — AMLODIPINE BESYLATE 5 MG: 5 TABLET ORAL at 08:25

## 2023-08-06 RX ADMIN — ISOSORBIDE MONONITRATE 30 MG: 30 TABLET, EXTENDED RELEASE ORAL at 08:25

## 2023-08-06 NOTE — PLAN OF CARE
Problem: Chronic Conditions and Co-morbidities  Goal: Patient's chronic conditions and co-morbidity symptoms are monitored and maintained or improved  8/6/2023 1642 by Jael Jaimes RN  Outcome: Progressing  8/6/2023 0533 by Benitez Hilliard RN  Outcome: Progressing  Flowsheets (Taken 8/5/2023 2007)  Care Plan - Patient's Chronic Conditions and Co-Morbidity Symptoms are Monitored and Maintained or Improved: Monitor and assess patient's chronic conditions and comorbid symptoms for stability, deterioration, or improvement     Problem: Discharge Planning  Goal: Discharge to home or other facility with appropriate resources  8/6/2023 1642 by Jael Jaimes RN  Outcome: Progressing  8/6/2023 0533 by Benitez Hilliard RN  Outcome: Progressing  Flowsheets (Taken 8/5/2023 2007)  Discharge to home or other facility with appropriate resources: Refer to discharge planning if patient needs post-hospital services based on physician order or complex needs related to functional status, cognitive ability or social support system

## 2023-08-06 NOTE — PLAN OF CARE
Problem: Chronic Conditions and Co-morbidities  Goal: Patient's chronic conditions and co-morbidity symptoms are monitored and maintained or improved  8/6/2023 0533 by Mary Sweeney RN  Outcome: Progressing  Flowsheets (Taken 8/5/2023 2007)  Care Plan - Patient's Chronic Conditions and Co-Morbidity Symptoms are Monitored and Maintained or Improved: Monitor and assess patient's chronic conditions and comorbid symptoms for stability, deterioration, or improvement  8/5/2023 1834 by Isaias Brandon RN  Outcome: Progressing     Problem: Discharge Planning  Goal: Discharge to home or other facility with appropriate resources  8/6/2023 0533 by Mary Sweeney RN  Outcome: Progressing  Flowsheets (Taken 8/5/2023 2007)  Discharge to home or other facility with appropriate resources: Refer to discharge planning if patient needs post-hospital services based on physician order or complex needs related to functional status, cognitive ability or social support system  8/5/2023 1834 by Isaias Brandon RN  Outcome: Progressing     Problem: Safety - Adult  Goal: Free from fall injury  8/6/2023 0533 by Mary Sweeney RN  Outcome: Progressing  8/5/2023 1834 by Isaias Brandon RN  Outcome: Progressing     Problem: ABCDS Injury Assessment  Goal: Absence of physical injury  8/6/2023 0533 by Mary Sweeney RN  Outcome: Progressing  8/5/2023 1834 by Isaias Brandon RN  Outcome: Progressing     Problem: Skin/Tissue Integrity  Goal: Absence of new skin breakdown  Description: 1. Monitor for areas of redness and/or skin breakdown  2. Assess vascular access sites hourly  3. Every 4-6 hours minimum:  Change oxygen saturation probe site  4. Every 4-6 hours:  If on nasal continuous positive airway pressure, respiratory therapy assess nares and determine need for appliance change or resting period.   8/6/2023 0533 by Mary Sweeney RN  Outcome: Progressing  8/5/2023 1834 by Isaias Brandon RN  Outcome: Progressing     Problem: Pain  Goal:

## 2023-08-07 VITALS
RESPIRATION RATE: 18 BRPM | SYSTOLIC BLOOD PRESSURE: 152 MMHG | DIASTOLIC BLOOD PRESSURE: 63 MMHG | WEIGHT: 86 LBS | HEIGHT: 57 IN | HEART RATE: 64 BPM | TEMPERATURE: 98 F | OXYGEN SATURATION: 98 % | BODY MASS INDEX: 18.55 KG/M2

## 2023-08-07 PROCEDURE — 6370000000 HC RX 637 (ALT 250 FOR IP): Performed by: INTERNAL MEDICINE

## 2023-08-07 PROCEDURE — 2700000000 HC OXYGEN THERAPY PER DAY

## 2023-08-07 RX ORDER — AMLODIPINE BESYLATE 5 MG/1
5 TABLET ORAL 2 TIMES DAILY
Status: DISCONTINUED | OUTPATIENT
Start: 2023-08-07 | End: 2023-08-07 | Stop reason: HOSPADM

## 2023-08-07 RX ORDER — AMLODIPINE BESYLATE 5 MG/1
5 TABLET ORAL 2 TIMES DAILY
Qty: 30 TABLET | Refills: 3 | Status: ON HOLD | OUTPATIENT
Start: 2023-08-07 | End: 2023-08-16 | Stop reason: HOSPADM

## 2023-08-07 RX ADMIN — AMLODIPINE BESYLATE 5 MG: 5 TABLET ORAL at 07:35

## 2023-08-07 RX ADMIN — ATORVASTATIN CALCIUM 80 MG: 40 TABLET, FILM COATED ORAL at 07:34

## 2023-08-07 RX ADMIN — ISOSORBIDE MONONITRATE 30 MG: 30 TABLET, EXTENDED RELEASE ORAL at 07:34

## 2023-08-07 NOTE — CARE COORDINATION
8/7/2023  Social Work Discharge Planning:Pt continues on 2l o2 here and uses none at home. Wean o2 Referral was made to [facility] . [facility] is following and order is in. Home with daughter in daughters home. Electronically signed by DOUG Stephenson on 8/7/2023 at 10:22 AM      8/7/2023  The Plan for Transition of Care is related to the following treatment goals: dme    The Patient  was provided with a choice of provider and agrees   with the discharge plan. [x] Yes [] No    Freedom of choice list was provided with basic dialogue that supports the patient's individualized plan of care/goals, treatment preferences and shares the quality data associated with the providers.  [x] Yes [] No

## 2023-08-07 NOTE — PROGRESS NOTES
Patient sleeping peacefully at this time. Information booklet left at the bedside and information for discharge placed on AVS for patient. I will attempt to see her later today after she wakes.      Trino KAY, RN  Heart Failure Navigator

## 2023-08-07 NOTE — CONSULTS
CHF NURSE NAVIGATOR CONSULT NOTE:    Patient currently admitted with diagnosis of diastolic heart failure. Patient was awake and alert, sitting in the chair during the consultation. Her daughter and son in law were at the bedside. She was engaged and asked appropriate questions throughout the education session. She is agreeable to heart failure education and self monitoring once discharged. She is a agreeable to a low sodium diet. Scheduling with the CHF clinic No: She will follow up with Dr Dionna Kan after discharge. She also has an appointment with Dr Silas Silva on August 16th . Future Appointments   Date Time Provider [address]   10/13/2023  1:15 PM Alfonso Grey MD [facility]     Barriers to Care:  Contributing risk factors for Heart Failure are identified as none. The patient is ordered:  Diet: ADULT DIET; Regular; Low Sodium (2 gm)   Sodium controlled diet Yes  Fluid restriction daily ordered (fluid restriction recommended if patient is hyponatremic and/or diuretic is initiated or increased) No  FR:   Daily Weights: Patient Vitals for the past 96 hrs (Last 3 readings):   Weight   08/07/23 0239 86 lb (39 kg)   08/06/23 0230 88 lb 11.2 oz (40.2 kg)   08/05/23 0215 84 lb 4.8 oz (38.2 kg)     I/O:   Intake/Output Summary (Last 24 hours) at 8/7/2023 1349  Last data filed at 8/7/2023 1311  Gross per 24 hour   Intake 600 ml   Output 400 ml   Net 200 ml              We reviewed the introduction to Heart Failure, the HF zones, signs and symptoms to report on day 1 of onset, medications, medication compliance, the importance of obtaining daily weights, following a low sodium diet, reading food labels for the sodium content, keeping physician appointments, and smoking cessation. We discussed writing / tracking daily weights on a calendar / log because a 5 pound gain in 1 week can sneak up if you are not tracking it.         I advised patient they can reduce the risk for Heart Failure exacerbations

## 2023-08-07 NOTE — CARE COORDINATION
Pt qualifies for home 02 2lnc  referral called to José Miguel Hawk at [facility]; orders on chart. Notified to deliver portable to room. Alice Cardona RN,CM.

## 2023-08-07 NOTE — PROGRESS NOTES
Pulse ox on room air resting 96%  Pulse ox on room air ambulating 85%  Pulse ox on 2L resting recovery 94%  Pulse ox on 2L ambulating was 92%

## 2023-08-07 NOTE — PLAN OF CARE
Problem: Chronic Conditions and Co-morbidities  Goal: Patient's chronic conditions and co-morbidity symptoms are monitored and maintained or improved  8/7/2023 0210 by Sobeida Simms RN  Outcome: Progressing  8/6/2023 1642 by Maribell Gonzáles RN  Outcome: Progressing     Problem: Discharge Planning  Goal: Discharge to home or other facility with appropriate resources  8/7/2023 0210 by Sobeida Simms RN  Outcome: Progressing  8/6/2023 1642 by Maribell Gonzáles RN  Outcome: Progressing     Problem: Safety - Adult  Goal: Free from fall injury  Outcome: Progressing     Problem: ABCDS Injury Assessment  Goal: Absence of physical injury  Outcome: Progressing     Problem: Skin/Tissue Integrity  Goal: Absence of new skin breakdown  Description: 1. Monitor for areas of redness and/or skin breakdown  2. Assess vascular access sites hourly  3. Every 4-6 hours minimum:  Change oxygen saturation probe site  4. Every 4-6 hours:  If on nasal continuous positive airway pressure, respiratory therapy assess nares and determine need for appliance change or resting period.   Outcome: Progressing     Problem: Pain  Goal: Verbalizes/displays adequate comfort level or baseline comfort level  Outcome: Progressing

## 2023-08-13 ENCOUNTER — APPOINTMENT (OUTPATIENT)
Dept: GENERAL RADIOLOGY | Age: 88
DRG: 291 | End: 2023-08-13
Payer: MEDICARE

## 2023-08-13 ENCOUNTER — HOSPITAL ENCOUNTER (INPATIENT)
Age: 88
LOS: 3 days | Discharge: HOSPICE | DRG: 291 | End: 2023-08-16
Attending: EMERGENCY MEDICINE | Admitting: INTERNAL MEDICINE
Payer: MEDICARE

## 2023-08-13 ENCOUNTER — TELEPHONE (OUTPATIENT)
Dept: OTHER | Facility: CLINIC | Age: 88
End: 2023-08-13

## 2023-08-13 DIAGNOSIS — E87.5 HYPERKALEMIA: Primary | ICD-10-CM

## 2023-08-13 DIAGNOSIS — J90 BILATERAL PLEURAL EFFUSION: ICD-10-CM

## 2023-08-13 DIAGNOSIS — J81.0 ACUTE PULMONARY EDEMA (HCC): ICD-10-CM

## 2023-08-13 DIAGNOSIS — R77.8 ELEVATED TROPONIN: ICD-10-CM

## 2023-08-13 LAB
ANION GAP SERPL CALCULATED.3IONS-SCNC: 9 MMOL/L (ref 7–16)
B.E.: -0.5 MMOL/L (ref -3–3)
BASOPHILS # BLD: 0.02 K/UL (ref 0–0.2)
BASOPHILS NFR BLD: 0 % (ref 0–2)
BNP SERPL-MCNC: ABNORMAL PG/ML (ref 0–450)
BUN SERPL-MCNC: 44 MG/DL (ref 6–23)
CALCIUM SERPL-MCNC: 10.2 MG/DL (ref 8.6–10.2)
CHLORIDE SERPL-SCNC: 106 MMOL/L (ref 98–107)
CHP ED QC CHECK: NORMAL
CHP ED QC CHECK: NORMAL
CO2 SERPL-SCNC: 24 MMOL/L (ref 22–29)
COHB: 1 % (ref 0–1.5)
CREAT SERPL-MCNC: 1.1 MG/DL (ref 0.5–1)
CRITICAL: ABNORMAL
DATE ANALYZED: ABNORMAL
DATE OF COLLECTION: ABNORMAL
EOSINOPHIL # BLD: 0.03 K/UL (ref 0.05–0.5)
EOSINOPHILS RELATIVE PERCENT: 0 % (ref 0–6)
ERYTHROCYTE [DISTWIDTH] IN BLOOD BY AUTOMATED COUNT: 14.7 % (ref 11.5–15)
GFR SERPL CREATININE-BSD FRML MDRD: 49 ML/MIN/1.73M2
GLUCOSE BLD-MCNC: 121 MG/DL (ref 74–99)
GLUCOSE BLD-MCNC: 140 MG/DL (ref 74–99)
GLUCOSE BLD-MCNC: 164 MG/DL
GLUCOSE BLD-MCNC: 164 MG/DL (ref 74–99)
GLUCOSE BLD-MCNC: 234 MG/DL
GLUCOSE BLD-MCNC: 234 MG/DL (ref 74–99)
GLUCOSE SERPL-MCNC: 99 MG/DL (ref 74–99)
HCO3: 24 MMOL/L (ref 22–26)
HCT VFR BLD AUTO: 32.5 % (ref 34–48)
HGB BLD-MCNC: 9.8 G/DL (ref 11.5–15.5)
HHB: 3.9 % (ref 0–5)
IMM GRANULOCYTES # BLD AUTO: 0.03 K/UL (ref 0–0.58)
IMM GRANULOCYTES NFR BLD: 0 % (ref 0–5)
LAB: ABNORMAL
LYMPHOCYTES NFR BLD: 0.72 K/UL (ref 1.5–4)
LYMPHOCYTES RELATIVE PERCENT: 11 % (ref 20–42)
Lab: ABNORMAL
MCH RBC QN AUTO: 26.7 PG (ref 26–35)
MCHC RBC AUTO-ENTMCNC: 30.2 G/DL (ref 32–34.5)
MCV RBC AUTO: 88.6 FL (ref 80–99.9)
METHB: 0.3 % (ref 0–1.5)
MODE: ABNORMAL
MONOCYTES NFR BLD: 0.29 K/UL (ref 0.1–0.95)
MONOCYTES NFR BLD: 4 % (ref 2–12)
NEUTROPHILS NFR BLD: 84 % (ref 43–80)
NEUTS SEG NFR BLD: 5.73 K/UL (ref 1.8–7.3)
O2 CONTENT: 12 ML/DL
O2 SATURATION: 96 % (ref 92–98.5)
O2HB: 94.8 % (ref 94–97)
OPERATOR ID: 3342
PATIENT TEMP: 37 C
PCO2: 38.5 MMHG (ref 35–45)
PH BLOOD GAS: 7.41 (ref 7.35–7.45)
PLATELET # BLD AUTO: 275 K/UL (ref 130–450)
PMV BLD AUTO: 10.8 FL (ref 7–12)
PO2: 84.9 MMHG (ref 75–100)
POTASSIUM SERPL-SCNC: 5.8 MMOL/L (ref 3.5–5)
PROCALCITONIN SERPL-MCNC: 0.07 NG/ML (ref 0–0.08)
RBC # BLD AUTO: 3.67 M/UL (ref 3.5–5.5)
SODIUM SERPL-SCNC: 139 MMOL/L (ref 132–146)
SOURCE, BLOOD GAS: ABNORMAL
THB: 8.9 G/DL (ref 11.5–16.5)
TIME ANALYZED: 631
TROPONIN I SERPL HS-MCNC: 72 NG/L (ref 0–9)
TROPONIN I SERPL HS-MCNC: 84 NG/L (ref 0–9)
WBC OTHER # BLD: 6.8 K/UL (ref 4.5–11.5)

## 2023-08-13 PROCEDURE — 94640 AIRWAY INHALATION TREATMENT: CPT

## 2023-08-13 PROCEDURE — 94664 DEMO&/EVAL PT USE INHALER: CPT

## 2023-08-13 PROCEDURE — 82805 BLOOD GASES W/O2 SATURATION: CPT

## 2023-08-13 PROCEDURE — 96365 THER/PROPH/DIAG IV INF INIT: CPT

## 2023-08-13 PROCEDURE — 83880 ASSAY OF NATRIURETIC PEPTIDE: CPT

## 2023-08-13 PROCEDURE — 82962 GLUCOSE BLOOD TEST: CPT

## 2023-08-13 PROCEDURE — 71045 X-RAY EXAM CHEST 1 VIEW: CPT

## 2023-08-13 PROCEDURE — 84145 PROCALCITONIN (PCT): CPT

## 2023-08-13 PROCEDURE — 6370000000 HC RX 637 (ALT 250 FOR IP): Performed by: INTERNAL MEDICINE

## 2023-08-13 PROCEDURE — 84484 ASSAY OF TROPONIN QUANT: CPT

## 2023-08-13 PROCEDURE — 96375 TX/PRO/DX INJ NEW DRUG ADDON: CPT

## 2023-08-13 PROCEDURE — 80048 BASIC METABOLIC PNL TOTAL CA: CPT

## 2023-08-13 PROCEDURE — 6370000000 HC RX 637 (ALT 250 FOR IP)

## 2023-08-13 PROCEDURE — 93005 ELECTROCARDIOGRAM TRACING: CPT | Performed by: EMERGENCY MEDICINE

## 2023-08-13 PROCEDURE — 99285 EMERGENCY DEPT VISIT HI MDM: CPT

## 2023-08-13 PROCEDURE — 2580000003 HC RX 258

## 2023-08-13 PROCEDURE — 2060000000 HC ICU INTERMEDIATE R&B

## 2023-08-13 PROCEDURE — 85025 COMPLETE CBC W/AUTO DIFF WBC: CPT

## 2023-08-13 PROCEDURE — 6360000002 HC RX W HCPCS

## 2023-08-13 PROCEDURE — 96368 THER/DIAG CONCURRENT INF: CPT

## 2023-08-13 RX ORDER — ISOSORBIDE MONONITRATE 30 MG/1
30 TABLET, EXTENDED RELEASE ORAL DAILY
Status: DISCONTINUED | OUTPATIENT
Start: 2023-08-13 | End: 2023-08-15

## 2023-08-13 RX ORDER — NITROGLYCERIN 0.4 MG/1
0.4 TABLET SUBLINGUAL EVERY 5 MIN PRN
Status: DISCONTINUED | OUTPATIENT
Start: 2023-08-13 | End: 2023-08-16 | Stop reason: HOSPADM

## 2023-08-13 RX ORDER — CALCIUM GLUCONATE 94 MG/ML
1000 INJECTION, SOLUTION INTRAVENOUS ONCE
Status: DISCONTINUED | OUTPATIENT
Start: 2023-08-13 | End: 2023-08-13 | Stop reason: CLARIF

## 2023-08-13 RX ORDER — AMLODIPINE BESYLATE 5 MG/1
5 TABLET ORAL 2 TIMES DAILY
Status: DISCONTINUED | OUTPATIENT
Start: 2023-08-13 | End: 2023-08-16 | Stop reason: HOSPADM

## 2023-08-13 RX ORDER — IPRATROPIUM BROMIDE AND ALBUTEROL SULFATE 2.5; .5 MG/3ML; MG/3ML
1 SOLUTION RESPIRATORY (INHALATION)
Status: DISCONTINUED | OUTPATIENT
Start: 2023-08-13 | End: 2023-08-16 | Stop reason: HOSPADM

## 2023-08-13 RX ORDER — DONEPEZIL HYDROCHLORIDE 10 MG/1
10 TABLET, FILM COATED ORAL NIGHTLY
Status: DISCONTINUED | OUTPATIENT
Start: 2023-08-13 | End: 2023-08-16 | Stop reason: HOSPADM

## 2023-08-13 RX ORDER — FUROSEMIDE 10 MG/ML
40 INJECTION INTRAMUSCULAR; INTRAVENOUS ONCE
Status: COMPLETED | OUTPATIENT
Start: 2023-08-13 | End: 2023-08-13

## 2023-08-13 RX ORDER — DEXTROSE MONOHYDRATE 100 MG/ML
INJECTION, SOLUTION INTRAVENOUS CONTINUOUS PRN
Status: DISCONTINUED | OUTPATIENT
Start: 2023-08-13 | End: 2023-08-16 | Stop reason: HOSPADM

## 2023-08-13 RX ADMIN — FUROSEMIDE 40 MG: 10 INJECTION, SOLUTION INTRAMUSCULAR; INTRAVENOUS at 09:18

## 2023-08-13 RX ADMIN — IPRATROPIUM BROMIDE AND ALBUTEROL SULFATE 1 DOSE: .5; 2.5 SOLUTION RESPIRATORY (INHALATION) at 20:18

## 2023-08-13 RX ADMIN — DEXTROSE MONOHYDRATE 250 ML: 100 INJECTION, SOLUTION INTRAVENOUS at 08:29

## 2023-08-13 RX ADMIN — IPRATROPIUM BROMIDE AND ALBUTEROL SULFATE 1 DOSE: .5; 2.5 SOLUTION RESPIRATORY (INHALATION) at 12:41

## 2023-08-13 RX ADMIN — CALCIUM GLUCONATE 1000 MG: 98 INJECTION, SOLUTION INTRAVENOUS at 08:27

## 2023-08-13 RX ADMIN — AMLODIPINE BESYLATE 5 MG: 5 TABLET ORAL at 20:44

## 2023-08-13 RX ADMIN — ISOSORBIDE MONONITRATE 30 MG: 30 TABLET, EXTENDED RELEASE ORAL at 12:30

## 2023-08-13 RX ADMIN — INSULIN HUMAN 10 UNITS: 100 INJECTION, SOLUTION PARENTERAL at 08:24

## 2023-08-13 RX ADMIN — IPRATROPIUM BROMIDE AND ALBUTEROL SULFATE 1 DOSE: .5; 2.5 SOLUTION RESPIRATORY (INHALATION) at 15:48

## 2023-08-13 RX ADMIN — DONEPEZIL HYDROCHLORIDE 10 MG: 10 TABLET, FILM COATED ORAL at 20:44

## 2023-08-13 RX ADMIN — AMLODIPINE BESYLATE 5 MG: 5 TABLET ORAL at 12:30

## 2023-08-13 ASSESSMENT — PAIN - FUNCTIONAL ASSESSMENT: PAIN_FUNCTIONAL_ASSESSMENT: NONE - DENIES PAIN

## 2023-08-13 ASSESSMENT — PAIN SCALES - GENERAL: PAINLEVEL_OUTOF10: 0

## 2023-08-13 ASSESSMENT — LIFESTYLE VARIABLES
HOW OFTEN DO YOU HAVE A DRINK CONTAINING ALCOHOL: NEVER
HOW MANY STANDARD DRINKS CONTAINING ALCOHOL DO YOU HAVE ON A TYPICAL DAY: PATIENT DOES NOT DRINK

## 2023-08-13 NOTE — ED NOTES
Per family pt pulse ox dropped to 84% on RA at home and was told to send her to the hospital if O2 dropped below 90%. Pt did not have her oxygen on at the time it dropped to 84% because per the family home health told them she could wear it as needed. Once they put the oxygen on it went back up in the 90's. Pt currently % on 2L NC.       [address], RN  08/13/23 2390

## 2023-08-13 NOTE — H&P
Examined pt in ER #[id number] with daughter and son at bedside    CHIEF COMPLAINT:  Chest pain and shortness of breath      HISTORY OF PRESENT ILLNESS:    Somewhat unfortunate readmission for this sweet 81 y/o with end stage CHF/critical aortic stenosis who is becoming progressively symptomatic but too much of a surgical risk and other mitigating medical problems to have intervention. End of life discussions and Palliative/hospice consultations were addressed at the recent last admission. The patient is a 80 y.o. female patient of Dr. Paris Cortes who presents with the above. She presents with sudden onset shortness of breath this morning around 2:30 AM.  The daughter checked on her pulse oximetry which was 84 on 2 L of oxygen. According to the daughter, patient was feeling more sleepy and fatigued yesterday evening. Also they do ensure that she felt feverish at 1 point yesterday. Denies any chills, n/v, headache, dizziness, vision changes, neck tenderness or stiffness, weakness, chest pain, palpitations, leg swelling/tenderness, cough, abd pain, dysuria, hematuria, diarrhea, constipation, bloody stools. She was recently discharged about 1 week back from the hospital after being managed for congestive heart failure, severe aortic stenosis and A-fib. He has a history of malnutrition, she was on Eliquis prior to the hospitalization.   He was transfused blood for low hemoglobin and the previous hospitalization and she was taken off Eliquis after the discussion with the cardiologist.       Past Medical History:    Past Medical History:   Diagnosis Date    A-fib [facility])     Concussion w loss of consciousness of unsp duration, init 11/2020    Fall with injury 10/2020    Hemoptysis 06/26/2023    Hip fracture, right ([address]) 05/30/2022    Hyperlipidemia     Hypertension     Laceration of left thumb without foreign body with damage to nail     Pneumonia     Shortness of breath 05/2022    Syncope 05/19/2020       Past

## 2023-08-13 NOTE — ED NOTES
Assumed care of patient at 7:30. Introduced self to patient and family as RN. Patient denies any complains. Awaiting disposition.  Call light within reach     Janee Connolly  08/13/23 3103

## 2023-08-13 NOTE — ED NOTES
Per lab all pt labs were received at [phone] and are still in process.       [address], RN  08/13/23 9389

## 2023-08-13 NOTE — PROGRESS NOTES
4 Eyes Skin Assessment     NAME:  Joaquin Westfall OF BIRTH:  5/1/1927  MEDICAL RECORD NUMBER:  [id number]    The patient is being assessed for  Admission    I agree that at least one RN has performed a thorough Head to Toe Skin Assessment on the patient. ALL assessment sites listed below have been assessed. Areas assessed by both nurses:    Head, Face, Ears, Shoulders, Back, Chest, Arms, Elbows, Hands, Sacrum. Buttock, Coccyx, Ischium, Legs. Feet and Heels, and Under Medical Devices         Does the Patient have a Wound?  No noted wound(s)       Miguel Prevention initiated by RN: Yes  Wound Care Orders initiated by RN: No    Pressure Injury (Stage 3,4, Unstageable, DTI, NWPT, and Complex wounds) if present, place Wound referral order by RN under : No    New Ostomies, if present place, Ostomy referral order under : No     Nurse 1 eSignature: Electronically signed by Justin Marie RN on 8/13/23 at 5:54 PM EDT    **SHARE this note so that the co-signing nurse can place an eSignature**    Nurse 2 eSignature: Electronically signed by Johana Dash RN on 8/13/23 at 5:56 PM EDT

## 2023-08-13 NOTE — ED PROVIDER NOTES
[facility]        Pt Name: Shante Richardson  MRN: [id number]  [facility] 5/1/1927  Date of evaluation: 8/13/2023  Provider: Edu Barahona MD  PCP: Gloria Miller MD  Note Started: 6:18 AM EDT 8/13/23    CHIEF COMPLAINT       Chief Complaint   Patient presents with    Shortness of Breath     Wears 1-2L NC at home. Pulse ox 100% on 2L upon arrival.    Irregular Heart Beat     Per EMS pt in afib, unsure if she has a hx       HISTORY OF PRESENT ILLNESS: 1 or more Elements   History From: Patient and Daughter    Limitations to history : None  Social Determinants : None    Shante Richardson is a 80 y.o. female who presents with sudden onset shortness of breath this morning around 2:30 AM.  The daughter checked on her pulse oximetry which was 84 on 2 L of oxygen. According to the daughter, patient was feeling more sleepy and fatigued yesterday evening. Also they do ensure that she felt feverish at 1 point yesterday. Denies any chills, n/v, headache, dizziness, vision changes, neck tenderness or stiffness, weakness, chest pain, palpitations, leg swelling/tenderness, cough, abd pain, dysuria, hematuria, diarrhea, constipation, bloody stools. She was recently discharged about 1 week back from the hospital after being managed for congestive heart failure, severe aortic stenosis and A-fib. He has a history of malnutrition, she was on Eliquis prior to the hospitalization.   He was transfused blood for low hemoglobin and the previous hospitalization and she was taken off Eliquis after the discussion with the cardiologist.    Nursing Notes were all reviewed and agreed with or any disagreements were addressed in the HPI.    ROS:   Pertinent positives and negatives are stated within HPI, all other systems reviewed and are negative.      --------------------------------------------- PAST HISTORY ---------------------------------------------  Past

## 2023-08-14 LAB
ANION GAP SERPL CALCULATED.3IONS-SCNC: 9 MMOL/L (ref 7–16)
BUN SERPL-MCNC: 44 MG/DL (ref 6–23)
CALCIUM SERPL-MCNC: 9.7 MG/DL (ref 8.6–10.2)
CHLORIDE SERPL-SCNC: 104 MMOL/L (ref 98–107)
CO2 SERPL-SCNC: 27 MMOL/L (ref 22–29)
CREAT SERPL-MCNC: 1.3 MG/DL (ref 0.5–1)
ERYTHROCYTE [DISTWIDTH] IN BLOOD BY AUTOMATED COUNT: 14.7 % (ref 11.5–15)
GFR SERPL CREATININE-BSD FRML MDRD: 39 ML/MIN/1.73M2
GLUCOSE SERPL-MCNC: 91 MG/DL (ref 74–99)
HCT VFR BLD AUTO: 28.5 % (ref 34–48)
HGB BLD-MCNC: 8.7 G/DL (ref 11.5–15.5)
MCH RBC QN AUTO: 26.7 PG (ref 26–35)
MCHC RBC AUTO-ENTMCNC: 30.5 G/DL (ref 32–34.5)
MCV RBC AUTO: 87.4 FL (ref 80–99.9)
PLATELET # BLD AUTO: 247 K/UL (ref 130–450)
PMV BLD AUTO: 10.9 FL (ref 7–12)
POTASSIUM SERPL-SCNC: 4.7 MMOL/L (ref 3.5–5)
RBC # BLD AUTO: 3.26 M/UL (ref 3.5–5.5)
SODIUM SERPL-SCNC: 140 MMOL/L (ref 132–146)
WBC OTHER # BLD: 5.5 K/UL (ref 4.5–11.5)

## 2023-08-14 PROCEDURE — 2060000000 HC ICU INTERMEDIATE R&B

## 2023-08-14 PROCEDURE — 36415 COLL VENOUS BLD VENIPUNCTURE: CPT

## 2023-08-14 PROCEDURE — 6370000000 HC RX 637 (ALT 250 FOR IP): Performed by: INTERNAL MEDICINE

## 2023-08-14 PROCEDURE — 94640 AIRWAY INHALATION TREATMENT: CPT

## 2023-08-14 PROCEDURE — 80048 BASIC METABOLIC PNL TOTAL CA: CPT

## 2023-08-14 PROCEDURE — 85027 COMPLETE CBC AUTOMATED: CPT

## 2023-08-14 RX ADMIN — ISOSORBIDE MONONITRATE 30 MG: 30 TABLET, EXTENDED RELEASE ORAL at 08:54

## 2023-08-14 RX ADMIN — IPRATROPIUM BROMIDE AND ALBUTEROL SULFATE 1 DOSE: .5; 2.5 SOLUTION RESPIRATORY (INHALATION) at 09:01

## 2023-08-14 RX ADMIN — IPRATROPIUM BROMIDE AND ALBUTEROL SULFATE 1 DOSE: .5; 2.5 SOLUTION RESPIRATORY (INHALATION) at 16:40

## 2023-08-14 RX ADMIN — IPRATROPIUM BROMIDE AND ALBUTEROL SULFATE 1 DOSE: .5; 2.5 SOLUTION RESPIRATORY (INHALATION) at 20:26

## 2023-08-14 RX ADMIN — AMLODIPINE BESYLATE 5 MG: 5 TABLET ORAL at 19:07

## 2023-08-14 RX ADMIN — DONEPEZIL HYDROCHLORIDE 10 MG: 10 TABLET, FILM COATED ORAL at 19:07

## 2023-08-14 ASSESSMENT — PAIN SCALES - GENERAL: PAINLEVEL_OUTOF10: 0

## 2023-08-14 NOTE — ACP (ADVANCE CARE PLANNING)
Advance Care Planning   Healthcare Decision Maker:    Primary Decision Maker: Grace Layne Child - [phone]    Secondary Decision Maker: Charlotte Hogan Child - [phone]    Patient daughter to provide copy of Healthcare Power of REBECA JONES and Living Will forms.

## 2023-08-14 NOTE — DISCHARGE INSTRUCTIONS
doctor whether you need another dose. My Goal for Self-management of Heart Failure Includes 5 steps :    1. Notice a change in symptoms ( weight gain, short of breath, leg swelling, decreased activity level, bloating. ...)    2. Evaluate the change: (use the Heart Failure Zones )     3. Decide to take action: decide what your options are, such as: (call your doctor for an extra visit, take a prescribed medication, such as your water pill if your doctor has given you directions to do so, [facility])    4. Come up with a strategy:  (now you call the doctor for advice / appointment. This is where you take action!!! Do not wait, catch the symptom early and treat it before it worsens. 5. Evaluate the response: The next day, check your Heart Failure Zones: are you in the GREEN ZONE (safe zone)? Worsening symptoms of YELLOW ZONE? Or have you moved to the RED ZONE and need to call 911 or go to the Emergency Room for evaluation? Call your doctor's office to update them on your symptoms of heart failure.

## 2023-08-14 NOTE — CARE COORDINATION
Social Work/Discharge Planning:  Hospice consult noted. Called patient daughter Grace Villagran (ph: [phone]) and completed initial assessment. Explained Social Work role and completed initial assessment. Patient lives with her daughter Grace Villagran and son-in-law in a one story house. PTA patient uses a rollator walker. Patient has a shower bench, nebulizer and wears two liters oxygen supplied through [employer]. Patient is active with [facility]. Discussed hospice order and family prefers to use [facility]. Grace Villagran met with [facility] 8/2. Grace Villagran states plan is home with either resumption of [facility] or with hospice services. Patient will need resume home care order if plan is home with [facility]. Referral made to [facility] with [address] Will continue to follow and assist with discharge planning.   Electronically signed by DOUG Blanton on 8/14/2023 at 10:44 AM

## 2023-08-14 NOTE — PLAN OF CARE
Problem: Safety - Adult  Goal: Free from fall injury  8/13/2023 2353 by Halie Capone RN  Outcome: Progressing  8/13/2023 1206 by Fanny Grey RN  Outcome: Progressing     Problem: Skin/Tissue Integrity  Goal: Absence of new skin breakdown  Description: 1. Monitor for areas of redness and/or skin breakdown  2. Assess vascular access sites hourly  3. Every 4-6 hours minimum:  Change oxygen saturation probe site  4. Every 4-6 hours:  If on nasal continuous positive airway pressure, respiratory therapy assess nares and determine need for appliance change or resting period.   8/13/2023 2353 by Halie Capone RN  Outcome: Progressing  8/13/2023 1206 by Fanny Grey RN  Outcome: Progressing     Problem: ABCDS Injury Assessment  Goal: Absence of physical injury  8/13/2023 2353 by Halie Capone RN  Outcome: Progressing  8/13/2023 1206 by Fanny Grey RN  Outcome: Progressing

## 2023-08-14 NOTE — PROGRESS NOTES
[facility]    Referral received. Chart reviewed. HOTV met with family last admission and they decided to pursue [address] at that time. Made visit to bedside. Carter Jeff is alert sitting in bed. Son Lenox Simmonds at bedside. She is [place] and per son periods of confusion. Lenox Simmonds states he and his sister make decisions together. The hospice benefit and philosophy were explained including that hospice is end of life care in which, per Medicare, a patient has a terminal diagnosis that life expectancy would be 6 months or less. Explained that once in hospice care, all aggressive treatments would be stopped and allow nature to takes its course with focus on comfort care for the patient. Explained hospice services at home, at [facility] with room/board private pay unless patient has Medicaid and the [facility] for short-term symptom management and respite. Lenox Simmonds is understanding of hospice information. All questions were answered. Call placed to daughter Stephany Vaz to provide her with information and answer questions. No answer. Left VM requesting a call back. Updated nursing and case management. Electronically signed by Gretchen Rey RN on 8/14/2023 at 11:05 AM   [phone] - Received call back from Stephany Vaz. All questions were answered. She is going to speak with her brother and let this nurse know what they decide.      Electronically signed by Gretchen Rey RN on 8/14/2023 at 1:21 PM

## 2023-08-15 DIAGNOSIS — R52 PAIN: ICD-10-CM

## 2023-08-15 DIAGNOSIS — R09.89 AIR HUNGER: ICD-10-CM

## 2023-08-15 DIAGNOSIS — F41.9 ANXIETY: ICD-10-CM

## 2023-08-15 DIAGNOSIS — Z51.5 HOSPICE CARE PATIENT: Primary | ICD-10-CM

## 2023-08-15 LAB
EKG ATRIAL RATE: 337 BPM
EKG Q-T INTERVAL: 372 MS
EKG QRS DURATION: 94 MS
EKG QTC CALCULATION (BAZETT): 415 MS
EKG R AXIS: 97 DEGREES
EKG T AXIS: -59 DEGREES
EKG VENTRICULAR RATE: 75 BPM

## 2023-08-15 PROCEDURE — 93010 ELECTROCARDIOGRAM REPORT: CPT | Performed by: INTERNAL MEDICINE

## 2023-08-15 PROCEDURE — 2060000000 HC ICU INTERMEDIATE R&B

## 2023-08-15 PROCEDURE — 6370000000 HC RX 637 (ALT 250 FOR IP): Performed by: INTERNAL MEDICINE

## 2023-08-15 PROCEDURE — 94640 AIRWAY INHALATION TREATMENT: CPT

## 2023-08-15 RX ORDER — MORPHINE SULFATE 100 MG/5ML
5 SOLUTION ORAL EVERY 6 HOURS PRN
Qty: 30 ML | Refills: 0 | Status: SHIPPED | OUTPATIENT
Start: 2023-08-15 | End: 2023-09-14

## 2023-08-15 RX ORDER — OLANZAPINE 5 MG/1
5 TABLET, ORALLY DISINTEGRATING ORAL EVERY 12 HOURS PRN
Qty: 5 TABLET | Refills: 0 | Status: SHIPPED | OUTPATIENT
Start: 2023-08-15

## 2023-08-15 RX ORDER — ISOSORBIDE MONONITRATE 60 MG/1
60 TABLET, EXTENDED RELEASE ORAL DAILY
Status: DISCONTINUED | OUTPATIENT
Start: 2023-08-15 | End: 2023-08-16 | Stop reason: HOSPADM

## 2023-08-15 RX ORDER — HALOPERIDOL 1 MG/1
1 TABLET ORAL EVERY 6 HOURS PRN
Qty: 5 TABLET | Refills: 0 | Status: SHIPPED | OUTPATIENT
Start: 2023-08-15

## 2023-08-15 RX ORDER — GLYCOPYRROLATE 2 MG/1
2 TABLET ORAL 3 TIMES DAILY PRN
Qty: 5 TABLET | Refills: 0 | Status: SHIPPED | OUTPATIENT
Start: 2023-08-15

## 2023-08-15 RX ORDER — ACETAMINOPHEN 650 MG/1
650 SUPPOSITORY RECTAL EVERY 4 HOURS PRN
Qty: 3 SUPPOSITORY | Refills: 0 | Status: SHIPPED | OUTPATIENT
Start: 2023-08-15

## 2023-08-15 RX ORDER — LORAZEPAM 1 MG/1
1 TABLET ORAL EVERY 6 HOURS PRN
Qty: 5 TABLET | Refills: 0 | Status: SHIPPED | OUTPATIENT
Start: 2023-08-15 | End: 2023-09-14

## 2023-08-15 RX ADMIN — AMLODIPINE BESYLATE 5 MG: 5 TABLET ORAL at 20:47

## 2023-08-15 RX ADMIN — AMLODIPINE BESYLATE 5 MG: 5 TABLET ORAL at 08:31

## 2023-08-15 RX ADMIN — IPRATROPIUM BROMIDE AND ALBUTEROL SULFATE 1 DOSE: .5; 2.5 SOLUTION RESPIRATORY (INHALATION) at 15:31

## 2023-08-15 RX ADMIN — DONEPEZIL HYDROCHLORIDE 10 MG: 10 TABLET, FILM COATED ORAL at 20:47

## 2023-08-15 RX ADMIN — IPRATROPIUM BROMIDE AND ALBUTEROL SULFATE 1 DOSE: .5; 2.5 SOLUTION RESPIRATORY (INHALATION) at 20:37

## 2023-08-15 RX ADMIN — IPRATROPIUM BROMIDE AND ALBUTEROL SULFATE 1 DOSE: .5; 2.5 SOLUTION RESPIRATORY (INHALATION) at 12:08

## 2023-08-15 RX ADMIN — ISOSORBIDE MONONITRATE 60 MG: 60 TABLET, EXTENDED RELEASE ORAL at 08:31

## 2023-08-15 RX ADMIN — IPRATROPIUM BROMIDE AND ALBUTEROL SULFATE 1 DOSE: .5; 2.5 SOLUTION RESPIRATORY (INHALATION) at 08:54

## 2023-08-15 NOTE — CONSULTS
Patient being discharged home with [facility] tomorrow 08/16.      Ailyn KAY, RN  Heart Failure Navigator

## 2023-08-15 NOTE — PROGRESS NOTES
Patient will be admitted to hospice care. Hospice comfort care kit was sent to hospice pharmacy to place in the home setting.

## 2023-08-15 NOTE — CARE COORDINATION
CASE MANAGEMENT. ... Spoke with MIA. Plan is for home with Hospice. Equipment will be delivered today and will be ready for tomorrow. Family to transport home tomorrow at noon. Nursing aware. Alida Azevedo with [facility] notified to cancel the referral. Will follow.

## 2023-08-15 NOTE — PROGRESS NOTES
HOSPICE [facility]    Consult received. Chart reviewed. Met with daughter Neli Thapa at bedside. Patient is sitting up in bed in no noted distress. She states she wants to go home. The hospice benefit and philosophy were explained including that hospice is end of life care in which, per Medicare, a patient has a terminal diagnosis that life expectancy would be 6 months or less. Explained that once in hospice care, all aggressive treatments would be stopped and allow nature to takes its course with focus on comfort care for the patient. Explained hospice services at home, at [facility] with room/board private pay unless patient has Medicaid and the [facility] for short-term symptom management and respite. Neli Thapa would like for her mother to go home with hospice tomorrow in order to get room ready for hospital equipment. Family will be here tomorrow at 15 to take patient home. Please remove any iv access. Will need a discharge order for tomorrow. CM, bedside nurse and charge nurse updated.      Electronically signed by Lisa Castañeda RN on 8/15/2023 at 1:23 PM  [phone]; [phone]

## 2023-08-16 VITALS
DIASTOLIC BLOOD PRESSURE: 48 MMHG | OXYGEN SATURATION: 100 % | SYSTOLIC BLOOD PRESSURE: 123 MMHG | HEART RATE: 68 BPM | TEMPERATURE: 97.6 F | RESPIRATION RATE: 18 BRPM | BODY MASS INDEX: 20.02 KG/M2 | WEIGHT: 92.8 LBS | HEIGHT: 57 IN

## 2023-08-16 PROCEDURE — 6370000000 HC RX 637 (ALT 250 FOR IP): Performed by: INTERNAL MEDICINE

## 2023-08-16 RX ORDER — NITROGLYCERIN 0.4 MG/1
0.4 TABLET SUBLINGUAL EVERY 5 MIN PRN
Qty: 25 TABLET | Refills: 3 | Status: SHIPPED | OUTPATIENT
Start: 2023-08-16

## 2023-08-16 RX ORDER — ISOSORBIDE MONONITRATE 60 MG/1
60 TABLET, EXTENDED RELEASE ORAL DAILY
Qty: 30 TABLET | Refills: 3 | Status: SHIPPED | OUTPATIENT
Start: 2023-08-16

## 2023-08-16 RX ADMIN — IPRATROPIUM BROMIDE AND ALBUTEROL SULFATE 1 DOSE: .5; 2.5 SOLUTION RESPIRATORY (INHALATION) at 12:48

## 2023-08-16 RX ADMIN — AMLODIPINE BESYLATE 5 MG: 5 TABLET ORAL at 08:28

## 2023-08-16 RX ADMIN — ISOSORBIDE MONONITRATE 60 MG: 60 TABLET, EXTENDED RELEASE ORAL at 08:28

## 2023-08-16 RX ADMIN — IPRATROPIUM BROMIDE AND ALBUTEROL SULFATE 1 DOSE: .5; 2.5 SOLUTION RESPIRATORY (INHALATION) at 08:52

## 2023-09-02 DIAGNOSIS — R52 PAIN: ICD-10-CM

## 2023-09-02 DIAGNOSIS — Z51.5 HOSPICE CARE PATIENT: ICD-10-CM

## 2023-09-02 DIAGNOSIS — R09.89 AIR HUNGER: ICD-10-CM

## 2023-09-02 DIAGNOSIS — F41.9 ANXIETY: ICD-10-CM

## 2023-09-02 DIAGNOSIS — I50.30 DIASTOLIC HEART FAILURE, UNSPECIFIED HF CHRONICITY (HCC): Primary | ICD-10-CM

## 2023-09-02 RX ORDER — MORPHINE SULFATE 100 MG/5ML
5 SOLUTION ORAL EVERY 4 HOURS PRN
Qty: 30 ML | Refills: 0 | Status: SHIPPED | OUTPATIENT
Start: 2023-09-02 | End: 2023-10-02

## 2023-09-02 RX ORDER — LORAZEPAM 0.5 MG/1
0.5 TABLET ORAL EVERY 4 HOURS PRN
Qty: 60 TABLET | Refills: 5 | Status: SHIPPED | OUTPATIENT
Start: 2023-09-02 | End: 2023-11-01

## 2023-09-02 NOTE — TELEPHONE ENCOUNTER
Orders adjusted on hospice; new prescriptions sent to hospice pharmacy.     Vito Arenas MD  9/2/2023